# Patient Record
Sex: MALE | Race: WHITE | Employment: UNEMPLOYED
[De-identification: names, ages, dates, MRNs, and addresses within clinical notes are randomized per-mention and may not be internally consistent; named-entity substitution may affect disease eponyms.]

---

## 2017-01-09 ENCOUNTER — OFFICE VISIT (OUTPATIENT)
Dept: INTERNAL MEDICINE | Facility: CLINIC | Age: 52
End: 2017-01-09

## 2017-01-09 VITALS
BODY MASS INDEX: 26.99 KG/M2 | RESPIRATION RATE: 14 BRPM | SYSTOLIC BLOOD PRESSURE: 132 MMHG | DIASTOLIC BLOOD PRESSURE: 82 MMHG | HEIGHT: 65 IN | WEIGHT: 162 LBS

## 2017-01-09 DIAGNOSIS — I10 ESSENTIAL HYPERTENSION, BENIGN: Primary | ICD-10-CM

## 2017-01-09 DIAGNOSIS — G80.9 INFANTILE CEREBRAL PALSY (HCC): ICD-10-CM

## 2017-01-09 DIAGNOSIS — F20.0 PARANOID SCHIZOPHRENIA (HCC): ICD-10-CM

## 2017-01-09 DIAGNOSIS — E03.9 ACQUIRED HYPOTHYROIDISM: ICD-10-CM

## 2017-01-09 PROCEDURE — 99213 OFFICE O/P EST LOW 20 MIN: CPT | Performed by: INTERNAL MEDICINE

## 2017-01-09 RX ORDER — RISPERIDONE 1 MG/1
TABLET, FILM COATED ORAL
COMMUNITY
Start: 2017-01-08

## 2017-01-09 RX ORDER — TRAZODONE HYDROCHLORIDE 50 MG/1
TABLET ORAL
COMMUNITY
Start: 2016-10-20 | End: 2018-03-16 | Stop reason: SDUPTHER

## 2017-01-09 ASSESSMENT — ENCOUNTER SYMPTOMS
SORE THROAT: 0
SHORTNESS OF BREATH: 0
RHINORRHEA: 0
COUGH: 0

## 2017-02-11 RX ORDER — LOVASTATIN 20 MG/1
TABLET ORAL
Qty: 90 TABLET | Refills: 3 | Status: SHIPPED | OUTPATIENT
Start: 2017-02-11 | End: 2017-05-17 | Stop reason: CLARIF

## 2017-04-14 ENCOUNTER — HOSPITAL ENCOUNTER (OUTPATIENT)
Age: 52
Discharge: HOME | End: 2017-04-14
Payer: MEDICARE

## 2017-04-14 LAB
ALBUMIN SERPL-MCNC: 4.3 G/DL (ref 3.5–5.2)
ALBUMIN/GLOBULIN RATIO: ABNORMAL (ref 1–2.5)
ALP BLD-CCNC: 76 U/L (ref 40–129)
ALT SERPL-CCNC: 36 U/L (ref 5–41)
ANION GAP SERPL CALCULATED.3IONS-SCNC: 12 MMOL/L (ref 9–17)
AST SERPL-CCNC: 23 U/L
BILIRUB SERPL-MCNC: 0.25 MG/DL (ref 0.3–1.2)
BUN BLDV-MCNC: 23 MG/DL (ref 6–20)
BUN/CREAT BLD: ABNORMAL (ref 9–20)
CALCIUM SERPL-MCNC: 9.3 MG/DL (ref 8.6–10.4)
CHLORIDE BLD-SCNC: 106 MMOL/L (ref 98–107)
CHOLESTEROL/HDL RATIO: 6.8
CHOLESTEROL: 211 MG/DL
CO2: 25 MMOL/L (ref 20–31)
CREAT SERPL-MCNC: 0.86 MG/DL (ref 0.7–1.2)
ESTIMATED AVERAGE GLUCOSE: 117 MG/DL
GFR AFRICAN AMERICAN: >60 ML/MIN
GFR NON-AFRICAN AMERICAN: >60 ML/MIN
GFR SERPL CREATININE-BSD FRML MDRD: ABNORMAL ML/MIN/{1.73_M2}
GFR SERPL CREATININE-BSD FRML MDRD: ABNORMAL ML/MIN/{1.73_M2}
GLUCOSE BLD-MCNC: 113 MG/DL (ref 70–99)
HBA1C MFR BLD: 5.7 % (ref 4–6)
HDLC SERPL-MCNC: 31 MG/DL
LDL CHOLESTEROL DIRECT: 101 MG/DL
LDL CHOLESTEROL: ABNORMAL MG/DL (ref 0–130)
POTASSIUM SERPL-SCNC: 4.4 MMOL/L (ref 3.7–5.3)
SODIUM BLD-SCNC: 143 MMOL/L (ref 135–144)
TOTAL PROTEIN: 7.5 G/DL (ref 6.4–8.3)
TRIGL SERPL-MCNC: 556 MG/DL
VLDLC SERPL CALC-MCNC: ABNORMAL MG/DL (ref 1–30)

## 2017-04-14 PROCEDURE — 83721 ASSAY OF BLOOD LIPOPROTEIN: CPT

## 2017-04-14 PROCEDURE — 83036 HEMOGLOBIN GLYCOSYLATED A1C: CPT

## 2017-04-14 PROCEDURE — 36415 COLL VENOUS BLD VENIPUNCTURE: CPT

## 2017-04-14 PROCEDURE — 80053 COMPREHEN METABOLIC PANEL: CPT

## 2017-04-14 PROCEDURE — 80061 LIPID PANEL: CPT

## 2017-04-26 ENCOUNTER — TELEPHONE (OUTPATIENT)
Dept: INTERNAL MEDICINE CLINIC | Age: 52
End: 2017-04-26

## 2017-05-09 ENCOUNTER — TELEPHONE (OUTPATIENT)
Dept: INTERNAL MEDICINE CLINIC | Age: 52
End: 2017-05-09

## 2017-05-17 ENCOUNTER — OFFICE VISIT (OUTPATIENT)
Dept: INTERNAL MEDICINE CLINIC | Age: 52
End: 2017-05-17
Payer: MEDICARE

## 2017-05-17 VITALS
DIASTOLIC BLOOD PRESSURE: 80 MMHG | BODY MASS INDEX: 27.32 KG/M2 | WEIGHT: 164 LBS | SYSTOLIC BLOOD PRESSURE: 120 MMHG | RESPIRATION RATE: 14 BRPM | HEIGHT: 65 IN

## 2017-05-17 DIAGNOSIS — G80.9 INFANTILE CEREBRAL PALSY (HCC): Primary | ICD-10-CM

## 2017-05-17 DIAGNOSIS — B37.49 CANDIDIASIS OF PERINEUM: ICD-10-CM

## 2017-05-17 DIAGNOSIS — E03.8 OTHER SPECIFIED HYPOTHYROIDISM: ICD-10-CM

## 2017-05-17 PROCEDURE — 3017F COLORECTAL CA SCREEN DOC REV: CPT | Performed by: INTERNAL MEDICINE

## 2017-05-17 PROCEDURE — 1036F TOBACCO NON-USER: CPT | Performed by: INTERNAL MEDICINE

## 2017-05-17 PROCEDURE — G8427 DOCREV CUR MEDS BY ELIG CLIN: HCPCS | Performed by: INTERNAL MEDICINE

## 2017-05-17 PROCEDURE — 99214 OFFICE O/P EST MOD 30 MIN: CPT | Performed by: INTERNAL MEDICINE

## 2017-05-17 PROCEDURE — G8419 CALC BMI OUT NRM PARAM NOF/U: HCPCS | Performed by: INTERNAL MEDICINE

## 2017-05-17 RX ORDER — LEVOTHYROXINE SODIUM 0.05 MG/1
TABLET ORAL
Qty: 90 TABLET | Refills: 3 | Status: SHIPPED | OUTPATIENT
Start: 2017-05-17 | End: 2018-09-04 | Stop reason: SDUPTHER

## 2017-05-17 RX ORDER — FUROSEMIDE 20 MG/1
20 TABLET ORAL DAILY
Qty: 90 TABLET | Refills: 3 | Status: SHIPPED | OUTPATIENT
Start: 2017-05-17 | End: 2018-06-02 | Stop reason: SDUPTHER

## 2017-05-17 RX ORDER — LOVASTATIN 20 MG/1
20 TABLET ORAL NIGHTLY
Qty: 90 TABLET | Refills: 3 | Status: SHIPPED | OUTPATIENT
Start: 2017-05-17 | End: 2018-07-11 | Stop reason: SDUPTHER

## 2017-05-17 RX ORDER — RISPERIDONE 1 MG/1
1 TABLET, FILM COATED ORAL DAILY
Qty: 90 TABLET | Refills: 3 | Status: CANCELLED | OUTPATIENT
Start: 2017-05-17

## 2017-05-17 ASSESSMENT — ENCOUNTER SYMPTOMS
COUGH: 0
RHINORRHEA: 0
SHORTNESS OF BREATH: 0
SORE THROAT: 0

## 2017-05-17 ASSESSMENT — PATIENT HEALTH QUESTIONNAIRE - PHQ9
SUM OF ALL RESPONSES TO PHQ9 QUESTIONS 1 & 2: 0
2. FEELING DOWN, DEPRESSED OR HOPELESS: 0
SUM OF ALL RESPONSES TO PHQ QUESTIONS 1-9: 0
1. LITTLE INTEREST OR PLEASURE IN DOING THINGS: 0

## 2017-09-01 ENCOUNTER — OFFICE VISIT (OUTPATIENT)
Dept: INTERNAL MEDICINE CLINIC | Age: 52
End: 2017-09-01
Payer: MEDICARE

## 2017-09-01 VITALS
DIASTOLIC BLOOD PRESSURE: 80 MMHG | HEIGHT: 65 IN | WEIGHT: 157 LBS | BODY MASS INDEX: 26.16 KG/M2 | SYSTOLIC BLOOD PRESSURE: 128 MMHG

## 2017-09-01 DIAGNOSIS — F20.0 PARANOID SCHIZOPHRENIA (HCC): ICD-10-CM

## 2017-09-01 DIAGNOSIS — E03.9 ACQUIRED HYPOTHYROIDISM: ICD-10-CM

## 2017-09-01 DIAGNOSIS — J40 TRACHEOBRONCHITIS: ICD-10-CM

## 2017-09-01 DIAGNOSIS — E78.2 MIXED HYPERLIPIDEMIA: ICD-10-CM

## 2017-09-01 DIAGNOSIS — Z12.11 COLON CANCER SCREENING: ICD-10-CM

## 2017-09-01 DIAGNOSIS — I10 ESSENTIAL HYPERTENSION, BENIGN: Primary | ICD-10-CM

## 2017-09-01 PROCEDURE — G8427 DOCREV CUR MEDS BY ELIG CLIN: HCPCS | Performed by: INTERNAL MEDICINE

## 2017-09-01 PROCEDURE — 1036F TOBACCO NON-USER: CPT | Performed by: INTERNAL MEDICINE

## 2017-09-01 PROCEDURE — 99214 OFFICE O/P EST MOD 30 MIN: CPT | Performed by: INTERNAL MEDICINE

## 2017-09-01 PROCEDURE — G8419 CALC BMI OUT NRM PARAM NOF/U: HCPCS | Performed by: INTERNAL MEDICINE

## 2017-09-01 PROCEDURE — 3017F COLORECTAL CA SCREEN DOC REV: CPT | Performed by: INTERNAL MEDICINE

## 2017-09-01 RX ORDER — CEPHALEXIN 500 MG/1
500 CAPSULE ORAL 3 TIMES DAILY
Qty: 30 CAPSULE | Refills: 0 | Status: SHIPPED | OUTPATIENT
Start: 2017-09-01 | End: 2017-09-11

## 2017-09-01 ASSESSMENT — ENCOUNTER SYMPTOMS
COUGH: 1
EYE DISCHARGE: 0
TROUBLE SWALLOWING: 0
SHORTNESS OF BREATH: 0
EYE PAIN: 0
BLOOD IN STOOL: 0
ABDOMINAL DISTENTION: 0
DIARRHEA: 0
SORE THROAT: 1
COLOR CHANGE: 0
WHEEZING: 0

## 2017-10-26 ENCOUNTER — TELEPHONE (OUTPATIENT)
Dept: OTHER | Facility: CLINIC | Age: 52
End: 2017-10-26

## 2018-03-02 ENCOUNTER — TELEPHONE (OUTPATIENT)
Dept: INTERNAL MEDICINE CLINIC | Age: 53
End: 2018-03-02

## 2018-03-02 NOTE — LETTER
NEGRITO JOSE [facility]  [address] [place] [address]  Phone: [phone]  Fax: [phone]      March 2, 2018     [address] [place] [id number]      Dear Catalina Franco:    I am writing you because I have been informed of your missed appointment(s). We ask that you please call 24 hours in advance if you are unable to make your appointment so that we can give that time to another patient in need. We care about you and the management of your healthcare and want to make sure that you follow up as recommended. I have to also mention, that in an effort to provide quality care and timely appointments to all my patients, it is our policy that patients be discharged from the practice if they do not show for three scheduled appointments. You would be informed of this termination by mail, and would have 30 days from the date of discharge to locate another physician. We're sorry you were unable to keep your appointment and hope that you are doing well. We would like to continue treating your healthcare needs. Please call the office to let us know your plans for treatment and to reschedule your appointment.      Sincerely,        Vitor Bird MD

## 2018-03-05 NOTE — TELEPHONE ENCOUNTER
Spoke with female who states they didn't get message from his father about the appointment.   appt rescheduled, letter mailed

## 2018-03-06 RX ORDER — LEVOTHYROXINE SODIUM 0.05 MG/1
TABLET ORAL
Qty: 90 TABLET | Refills: 0 | Status: SHIPPED | OUTPATIENT
Start: 2018-03-06 | End: 2018-03-16 | Stop reason: SDUPTHER

## 2018-03-16 ENCOUNTER — OFFICE VISIT (OUTPATIENT)
Dept: INTERNAL MEDICINE CLINIC | Age: 53
End: 2018-03-16
Payer: MEDICARE

## 2018-03-16 VITALS
BODY MASS INDEX: 26.66 KG/M2 | DIASTOLIC BLOOD PRESSURE: 60 MMHG | HEART RATE: 82 BPM | HEIGHT: 65 IN | WEIGHT: 160 LBS | SYSTOLIC BLOOD PRESSURE: 108 MMHG | OXYGEN SATURATION: 95 %

## 2018-03-16 DIAGNOSIS — G83.89 CEREBRAL PARESIS WITH HOMOLATERAL ATAXIA (HCC): ICD-10-CM

## 2018-03-16 DIAGNOSIS — F20.0 PARANOID SCHIZOPHRENIA (HCC): ICD-10-CM

## 2018-03-16 DIAGNOSIS — Z12.11 SCREENING FOR COLON CANCER: ICD-10-CM

## 2018-03-16 DIAGNOSIS — G80.9 INFANTILE CEREBRAL PALSY (HCC): Primary | ICD-10-CM

## 2018-03-16 DIAGNOSIS — E03.9 ACQUIRED HYPOTHYROIDISM: ICD-10-CM

## 2018-03-16 DIAGNOSIS — F20.0 PARANOID SCHIZOPHRENIA, SUBCHRONIC CONDITION (HCC): ICD-10-CM

## 2018-03-16 PROCEDURE — G8419 CALC BMI OUT NRM PARAM NOF/U: HCPCS | Performed by: INTERNAL MEDICINE

## 2018-03-16 PROCEDURE — G8484 FLU IMMUNIZE NO ADMIN: HCPCS | Performed by: INTERNAL MEDICINE

## 2018-03-16 PROCEDURE — G8427 DOCREV CUR MEDS BY ELIG CLIN: HCPCS | Performed by: INTERNAL MEDICINE

## 2018-03-16 PROCEDURE — 1036F TOBACCO NON-USER: CPT | Performed by: INTERNAL MEDICINE

## 2018-03-16 PROCEDURE — 3017F COLORECTAL CA SCREEN DOC REV: CPT | Performed by: INTERNAL MEDICINE

## 2018-03-16 PROCEDURE — 99213 OFFICE O/P EST LOW 20 MIN: CPT | Performed by: INTERNAL MEDICINE

## 2018-03-16 RX ORDER — TRAZODONE HYDROCHLORIDE 100 MG/1
TABLET ORAL
Refills: 2 | COMMUNITY
Start: 2018-03-06

## 2018-03-16 ASSESSMENT — ENCOUNTER SYMPTOMS
ABDOMINAL DISTENTION: 0
COUGH: 0
SHORTNESS OF BREATH: 0
COLOR CHANGE: 0
WHEEZING: 0
EYE PAIN: 0
EYE DISCHARGE: 0
TROUBLE SWALLOWING: 0
DIARRHEA: 0
BLOOD IN STOOL: 0

## 2018-03-16 NOTE — PROGRESS NOTES
Subjective:      Patient ID: Vanessa Worley is a 45 y.o. male. Chronic Disease Visit Information    BP Readings from Last 3 Encounters:   03/16/18 108/60   09/01/17 128/80   05/17/17 120/80          Hemoglobin A1C (%)   Date Value   04/14/2017 5.7   05/20/2016 5.4   03/01/2016 5.4     LDL Cholesterol (mg/dL)   Date Value   04/14/2017          HDL (mg/dL)   Date Value   04/14/2017 31 (L)     BUN (mg/dL)   Date Value   04/14/2017 23 (H)     CREATININE (mg/dL)   Date Value   04/14/2017 0.86     Glucose (mg/dL)   Date Value   04/14/2017 113 (H)   04/18/2012 99            Have you changed or started any medications since your last visit including any over-the-counter medicines, vitamins, or herbal medicines? no   Are you having any side effects from any of your medications? -  no  Have you stopped taking any of your medications? Is so, why? -  no    Have you seen any other physician or provider since your last visit? No  Have you had any other diagnostic tests since your last visit? No  Have you been seen in the emergency room and/or had an admission to a hospital since we last saw you? No  Have you had your annual diabetic retinal (eye) exam? No  Have you had your routine dental cleaning in the past 6 months? no    Have you activated your [employer] account? If not, what are your barriers?  Yes     Patient Care Team:  Vega Boykin MD as PCP - Soledad Sharpe MD as PCP - S Attributed Provider         Medical History Review  Past Medical, Family, and Social History reviewed and does not contribute to the patient presenting condition    Health Maintenance   Topic Date Due    Hepatitis C screen  1965    HIV screen  12/29/1978    DTaP/Tdap/Td vaccine (1 - Tdap) 12/29/1982    Shingles Vaccine (1 of 2 - 2 Dose Series) 12/29/2013    Colon Cancer Screen FIT/FOBT  09/25/2016    TSH testing  03/01/2017    Flu vaccine (1) 03/28/2019 (Originally 9/1/2017)    A1C test (Diabetic or Prediabetic)  04/14/2018 breath sounds normal. No accessory muscle usage. No tachypnea and no bradypnea. No respiratory distress. He has no wheezes. He has no rales. Abdominal: Soft. Bowel sounds are normal. He exhibits no distension. There is no tenderness. There is no rebound. Musculoskeletal: He exhibits edema and deformity. He exhibits no tenderness. vonda lower ext defomity  Walks with crutches     Lymphadenopathy:        Head (right side): No submental and no submandibular adenopathy present. Head (left side): No submental and no submandibular adenopathy present. He has no cervical adenopathy. Neurological: He is alert and oriented to person, place, and time. He displays no atrophy. No cranial nerve deficit or sensory deficit. He exhibits normal muscle tone. Coordination normal.   Skin: Skin is warm. No bruising, no ecchymosis and no rash noted. He is not diaphoretic. No pallor. Psychiatric: He has a normal mood and affect. His behavior is normal. His mood appears not anxious. His affect is not angry. His speech is not slurred. He is not aggressive. Cognition and memory are not impaired. He expresses no homicidal ideation. I have personally reviewed and agree with the patient BHAVIK LUCAS and Cameron Christina 20 is a 45 y.o. male who presents today for follow up on his chronic medical conditions as noted below.   Sheila Reach is c/o of   Chief Complaint   Patient presents with    Hypertension     managment    Other     hcc gap score       Patient Active Problem List:     Essential hypertension, benign     Infantile cerebral palsy ([address]     Hypothyroidism     Paranoid schizophrenia ([address]     Candidiasis of perineum     Mixed hyperlipidemia     Past Medical History:   Diagnosis Date    Bipolar disorder, unspecified     Depression     Edema     Essential hypertension, benign     Hypothyroid     Impetigo     Infantile cerebral palsy, unspecified     Other and unspecified hyperlipidemia     Schizophrenia, MCHC 34.2 08/31/2014    RDW 13.1 08/31/2014     08/31/2014     04/18/2012    MPV 7.1 08/31/2014       Lab Results   Component Value Date    LABA1C 5.7 04/14/2017       Lab Results   Component Value Date    HDL 31 04/14/2017    LDLCHOLESTEROL      04/14/2017       Assessment:      1. Infantile cerebral palsy ([address]     2. Paranoid schizophrenia ([address]     3. Screening for colon cancer  POCT Fecal Immunochemical Test (FIT)   4. Acquired hypothyroidism             Plan:       No Follow-up on file. Orders Placed This Encounter   Procedures    POCT Fecal Immunochemical Test (FIT)     Standing Status:   Future     Standing Expiration Date:   3/16/2019     No orders of the defined types were placed in this encounter. Quality & Risk Score Accuracy - MEDICARE ADVANTAGE    Visit Dx:  Paranoid schizophrenia, subchronic condition ([facility]  Stable based upon review of recent symptoms. Continue current treatment plan and follow up at least yearly. Visit Dx:  Cerebral paresis with homolateral ataxia ([facility]  Stable based upon symptoms and exam. Continue current treatment plan and follow up at least yearly.   Last edited 03/16/18 13:33 EDT by Paris Isidro MD

## 2018-05-09 ENCOUNTER — HOSPITAL ENCOUNTER (OUTPATIENT)
Age: 53
Discharge: HOME | End: 2018-05-09
Payer: MEDICARE

## 2018-05-09 LAB
ALBUMIN SERPL-MCNC: 4.2 G/DL (ref 3.5–5.2)
ALBUMIN/GLOBULIN RATIO: ABNORMAL (ref 1–2.5)
ALP BLD-CCNC: 77 U/L (ref 40–129)
ALT SERPL-CCNC: 34 U/L (ref 5–41)
ANION GAP SERPL CALCULATED.3IONS-SCNC: 12 MMOL/L (ref 9–17)
AST SERPL-CCNC: 22 U/L
BILIRUB SERPL-MCNC: 0.45 MG/DL (ref 0.3–1.2)
BUN BLDV-MCNC: 12 MG/DL (ref 6–20)
BUN/CREAT BLD: ABNORMAL (ref 9–20)
CALCIUM SERPL-MCNC: 8.6 MG/DL (ref 8.6–10.4)
CHLORIDE BLD-SCNC: 104 MMOL/L (ref 98–107)
CHOLESTEROL/HDL RATIO: 4.7
CHOLESTEROL: 180 MG/DL
CO2: 25 MMOL/L (ref 20–31)
CREAT SERPL-MCNC: 0.83 MG/DL (ref 0.7–1.2)
ESTIMATED AVERAGE GLUCOSE: 117 MG/DL
GFR AFRICAN AMERICAN: >60 ML/MIN
GFR NON-AFRICAN AMERICAN: >60 ML/MIN
GFR SERPL CREATININE-BSD FRML MDRD: ABNORMAL ML/MIN/{1.73_M2}
GFR SERPL CREATININE-BSD FRML MDRD: ABNORMAL ML/MIN/{1.73_M2}
GLUCOSE BLD-MCNC: 106 MG/DL (ref 70–99)
HBA1C MFR BLD: 5.7 % (ref 4–6)
HDLC SERPL-MCNC: 38 MG/DL
LDL CHOLESTEROL: 83 MG/DL (ref 0–130)
POTASSIUM SERPL-SCNC: 4 MMOL/L (ref 3.7–5.3)
SODIUM BLD-SCNC: 141 MMOL/L (ref 135–144)
TOTAL PROTEIN: 7.2 G/DL (ref 6.4–8.3)
TRIGL SERPL-MCNC: 294 MG/DL
VLDLC SERPL CALC-MCNC: ABNORMAL MG/DL (ref 1–30)

## 2018-05-09 PROCEDURE — 36415 COLL VENOUS BLD VENIPUNCTURE: CPT

## 2018-05-09 PROCEDURE — 80061 LIPID PANEL: CPT

## 2018-05-09 PROCEDURE — 80053 COMPREHEN METABOLIC PANEL: CPT

## 2018-05-09 PROCEDURE — 83036 HEMOGLOBIN GLYCOSYLATED A1C: CPT

## 2018-06-04 RX ORDER — FUROSEMIDE 20 MG/1
20 TABLET ORAL DAILY
Qty: 90 TABLET | Refills: 2 | Status: SHIPPED | OUTPATIENT
Start: 2018-06-04 | End: 2019-04-17 | Stop reason: SDUPTHER

## 2018-07-11 RX ORDER — LEVOTHYROXINE SODIUM 0.05 MG/1
TABLET ORAL
Qty: 90 TABLET | Refills: 2 | Status: SHIPPED | OUTPATIENT
Start: 2018-07-11 | End: 2018-09-04 | Stop reason: SDUPTHER

## 2018-07-11 RX ORDER — LEVOTHYROXINE SODIUM 0.05 MG/1
TABLET ORAL
Qty: 90 TABLET | Refills: 2 | Status: SHIPPED | OUTPATIENT
Start: 2018-07-11 | End: 2019-04-08 | Stop reason: SDUPTHER

## 2018-07-11 RX ORDER — LOVASTATIN 20 MG/1
TABLET ORAL
Qty: 90 TABLET | Refills: 2 | Status: SHIPPED | OUTPATIENT
Start: 2018-07-11 | End: 2019-04-07 | Stop reason: SDUPTHER

## 2018-09-04 ENCOUNTER — HOSPITAL ENCOUNTER (OUTPATIENT)
Age: 53
Setting detail: SPECIMEN
Discharge: HOME | End: 2018-09-04
Payer: MEDICARE

## 2018-09-04 ENCOUNTER — OFFICE VISIT (OUTPATIENT)
Dept: INTERNAL MEDICINE CLINIC | Age: 53
End: 2018-09-04
Payer: MEDICARE

## 2018-09-04 VITALS
HEIGHT: 61 IN | WEIGHT: 161 LBS | DIASTOLIC BLOOD PRESSURE: 62 MMHG | SYSTOLIC BLOOD PRESSURE: 108 MMHG | BODY MASS INDEX: 30.4 KG/M2

## 2018-09-04 DIAGNOSIS — I10 ESSENTIAL HYPERTENSION, BENIGN: ICD-10-CM

## 2018-09-04 DIAGNOSIS — K59.1 FUNCTIONAL DIARRHEA: Primary | ICD-10-CM

## 2018-09-04 DIAGNOSIS — E03.9 ACQUIRED HYPOTHYROIDISM: ICD-10-CM

## 2018-09-04 DIAGNOSIS — Z12.11 SCREENING FOR COLON CANCER: ICD-10-CM

## 2018-09-04 LAB — TSH SERPL DL<=0.05 MIU/L-ACNC: 4.22 MIU/L (ref 0.3–5)

## 2018-09-04 PROCEDURE — G8427 DOCREV CUR MEDS BY ELIG CLIN: HCPCS | Performed by: INTERNAL MEDICINE

## 2018-09-04 PROCEDURE — 99214 OFFICE O/P EST MOD 30 MIN: CPT | Performed by: INTERNAL MEDICINE

## 2018-09-04 PROCEDURE — 3017F COLORECTAL CA SCREEN DOC REV: CPT | Performed by: INTERNAL MEDICINE

## 2018-09-04 PROCEDURE — 1036F TOBACCO NON-USER: CPT | Performed by: INTERNAL MEDICINE

## 2018-09-04 PROCEDURE — G8419 CALC BMI OUT NRM PARAM NOF/U: HCPCS | Performed by: INTERNAL MEDICINE

## 2018-09-04 ASSESSMENT — PATIENT HEALTH QUESTIONNAIRE - PHQ9
2. FEELING DOWN, DEPRESSED OR HOPELESS: 0
SUM OF ALL RESPONSES TO PHQ QUESTIONS 1-9: 0
SUM OF ALL RESPONSES TO PHQ QUESTIONS 1-9: 0
SUM OF ALL RESPONSES TO PHQ9 QUESTIONS 1 & 2: 0
1. LITTLE INTEREST OR PLEASURE IN DOING THINGS: 0

## 2018-09-04 ASSESSMENT — ENCOUNTER SYMPTOMS
DIARRHEA: 1
SHORTNESS OF BREATH: 0

## 2018-09-04 NOTE — PROGRESS NOTES
Subjective:      Patient ID: Yossi Stevenson is a 45 y.o. male. Chronic Disease Visit Information    BP Readings from Last 3 Encounters:   03/16/18 108/60   09/01/17 128/80   05/17/17 120/80          Hemoglobin A1C (%)   Date Value   05/09/2018 5.7   04/14/2017 5.7   05/20/2016 5.4     LDL Cholesterol (mg/dL)   Date Value   05/09/2018 83     HDL (mg/dL)   Date Value   05/09/2018 38 (L)     BUN (mg/dL)   Date Value   05/09/2018 12     CREATININE (mg/dL)   Date Value   05/09/2018 0.83     Glucose (mg/dL)   Date Value   05/09/2018 106 (H)   04/18/2012 99            Have you changed or started any medications since your last visit including any over-the-counter medicines, vitamins, or herbal medicines? no   Are you having any side effects from any of your medications? -  no  Have you stopped taking any of your medications? Is so, why? -  no    Have you seen any other physician or provider since your last visit? No  Have you had any other diagnostic tests since your last visit? No  Have you been seen in the emergency room and/or had an admission to a hospital since we last saw you? No  Have you had your annual diabetic retinal (eye) exam? No  Have you had your routine dental cleaning in the past 6 months? no    Have you activated your [employer] account? If not, what are your barriers?  Yes     Patient Care Team:  Maria Mcclure MD as PCP - General (Internal Medicine)  Maria Mcclure MD as PCP - S Attributed Provider     Chief Complaint   Patient presents with    Hypertension    Hyperlipidemia    Hypothyroidism              Medical History Review  Past Medical, Family, and Social History reviewed and does contribute to the patient presenting condition    Health Maintenance   Topic Date Due    Hepatitis C screen  1965    HIV screen  12/29/1978    Colon Cancer Screen FIT/FOBT  09/25/2016    TSH testing  03/01/2017    Flu vaccine (1) 03/28/2019 (Originally 9/1/2018)    DTaP/Tdap/Td vaccine (1 - Tdap) 09/05/2019 (Originally 12/29/1982)    Shingles Vaccine (1 of 2 - 2 Dose Series) 09/13/2019 (Originally 12/29/2013)    A1C test (Diabetic or Prediabetic)  05/09/2019    Potassium monitoring  05/09/2019    Creatinine monitoring  05/09/2019    Lipid screen  05/09/2023       Neurologic Problem   The patient's primary symptoms include a loss of balance and weakness. Primary symptoms comment: has cerebral palsy. This is a chronic problem. The current episode started more than 1 year ago. Pertinent negatives include no fatigue, fever or shortness of breath. Review of Systems   Constitutional: Negative for fatigue and fever. Respiratory: Negative for shortness of breath. Gastrointestinal: Positive for diarrhea. He has soft and more frequent stools but he has no mucus or blood   Neurological: Positive for weakness and loss of balance. Objective:   Physical Exam   Constitutional: He is oriented to person, place, and time. He appears well-developed and well-nourished. He is active and cooperative. He does not have a sickly appearance. No distress. HENT:   Head: Normocephalic and atraumatic. Head is without abrasion, without contusion and without laceration. Right Ear: External ear normal.   Left Ear: External ear normal.   Nose: No mucosal edema, nose lacerations or septal deviation. Mouth/Throat: Oropharynx is clear and moist. Mucous membranes are not pale. No oropharyngeal exudate or posterior oropharyngeal edema. Eyes: Pupils are equal, round, and reactive to light. EOM are normal. Right eye exhibits no discharge. Left eye exhibits no discharge. No scleral icterus. Neck: Neck supple. No hepatojugular reflux present. Carotid bruit is not present. No thyroid mass and no thyromegaly present. Cardiovascular: Normal rate, regular rhythm and normal heart sounds. No murmur heard. Pulmonary/Chest: Effort normal and breath sounds normal. He has no wheezes. He has no rales.    Abdominal: Soft. He exhibits no distension, no ascites and no mass. There is no hepatosplenomegaly. There is no tenderness. There is no rigidity, no rebound and no CVA tenderness. No hernia. Hernia confirmed negative in the ventral area. Genitourinary:         Musculoskeletal: Normal range of motion. He exhibits no tenderness. Right lower leg: He exhibits edema. Left lower leg: He exhibits edema. Lymphadenopathy:     He has no cervical adenopathy. He has no axillary adenopathy. Right: No supraclavicular and no epitrochlear adenopathy present. Left: No supraclavicular and no epitrochlear adenopathy present. Neurological: He is alert and oriented to person, place, and time. He displays atrophy and tremor. No cranial nerve deficit. He exhibits abnormal muscle tone (cerebral palsy). He displays no seizure activity. Coordination and gait abnormal.   Has cerebral palsy     Skin: Skin is warm and dry. No bruising, no laceration, no petechiae and no rash noted. He is not diaphoretic. No cyanosis or erythema. No pallor. Nails show no clubbing. Psychiatric: He has a normal mood and affect. His speech is normal and behavior is normal. Judgment and thought content normal. Cognition and memory are normal.   Vitals reviewed. Assessment / Plan:       Diagnosis Orders   1. Functional diarrhea  It could be due to Zoloft,he was advised to cut down Zoloft to 25 mg daily   2. Screening for colon cancer  POCT FECAL IMMUNOCHEMICAL TEST (FIT)   3. Acquired hypothyroidism  TSH without Reflex   4. Essential hypertension, benign       Return in about 12 weeks (around 11/27/2018) for Follow Up, hypertension.   Orders Placed This Encounter   Procedures    TSH without Reflex     Standing Status:   Future     Standing Expiration Date:   9/4/2019    POCT FECAL IMMUNOCHEMICAL TEST (FIT)     Standing Status:   Future     Standing Expiration Date:   9/4/2019     No orders of the defined types were placed in this encounter.

## 2019-04-08 RX ORDER — LOVASTATIN 20 MG/1
TABLET ORAL
Qty: 90 TABLET | Refills: 3 | Status: SHIPPED | OUTPATIENT
Start: 2019-04-08 | End: 2020-01-20 | Stop reason: SDUPTHER

## 2019-04-08 RX ORDER — LEVOTHYROXINE SODIUM 0.05 MG/1
TABLET ORAL
Qty: 90 TABLET | Refills: 3 | Status: SHIPPED | OUTPATIENT
Start: 2019-04-08 | End: 2020-01-20 | Stop reason: SDUPTHER

## 2019-04-17 RX ORDER — FUROSEMIDE 20 MG/1
20 TABLET ORAL DAILY
Qty: 90 TABLET | Refills: 0 | Status: SHIPPED | OUTPATIENT
Start: 2019-04-17 | End: 2019-05-01 | Stop reason: SDUPTHER

## 2019-05-01 ENCOUNTER — OFFICE VISIT (OUTPATIENT)
Dept: INTERNAL MEDICINE CLINIC | Age: 54
End: 2019-05-01
Payer: MEDICARE

## 2019-05-01 VITALS
HEART RATE: 78 BPM | HEIGHT: 61 IN | DIASTOLIC BLOOD PRESSURE: 84 MMHG | SYSTOLIC BLOOD PRESSURE: 124 MMHG | BODY MASS INDEX: 24.35 KG/M2 | OXYGEN SATURATION: 98 % | TEMPERATURE: 98.2 F | WEIGHT: 129 LBS

## 2019-05-01 DIAGNOSIS — J06.9 UPPER RESPIRATORY TRACT INFECTION, UNSPECIFIED TYPE: Primary | ICD-10-CM

## 2019-05-01 PROCEDURE — G8420 CALC BMI NORM PARAMETERS: HCPCS | Performed by: PHYSICIAN ASSISTANT

## 2019-05-01 PROCEDURE — G8427 DOCREV CUR MEDS BY ELIG CLIN: HCPCS | Performed by: PHYSICIAN ASSISTANT

## 2019-05-01 PROCEDURE — 1036F TOBACCO NON-USER: CPT | Performed by: PHYSICIAN ASSISTANT

## 2019-05-01 PROCEDURE — G8510 SCR DEP NEG, NO PLAN REQD: HCPCS | Performed by: PHYSICIAN ASSISTANT

## 2019-05-01 PROCEDURE — 99213 OFFICE O/P EST LOW 20 MIN: CPT | Performed by: PHYSICIAN ASSISTANT

## 2019-05-01 PROCEDURE — 3017F COLORECTAL CA SCREEN DOC REV: CPT | Performed by: PHYSICIAN ASSISTANT

## 2019-05-01 RX ORDER — AZITHROMYCIN 250 MG/1
250 TABLET, FILM COATED ORAL SEE ADMIN INSTRUCTIONS
Qty: 6 TABLET | Refills: 0 | Status: SHIPPED | OUTPATIENT
Start: 2019-05-01 | End: 2019-05-06

## 2019-05-01 RX ORDER — BENZONATATE 100 MG/1
100-200 CAPSULE ORAL 3 TIMES DAILY PRN
Qty: 60 CAPSULE | Refills: 0 | Status: SHIPPED | OUTPATIENT
Start: 2019-05-01 | End: 2019-08-18 | Stop reason: SDUPTHER

## 2019-05-01 RX ORDER — FUROSEMIDE 20 MG/1
20 TABLET ORAL DAILY
Qty: 90 TABLET | Refills: 0 | Status: SHIPPED | OUTPATIENT
Start: 2019-05-01 | End: 2019-07-27 | Stop reason: SDUPTHER

## 2019-05-01 ASSESSMENT — ENCOUNTER SYMPTOMS
COUGH: 1
NAUSEA: 0
EYE REDNESS: 0
SHORTNESS OF BREATH: 0
SINUS PRESSURE: 0
VOMITING: 0
SORE THROAT: 1
CHEST TIGHTNESS: 0
WHEEZING: 0
COLOR CHANGE: 0
EYE PAIN: 0
RHINORRHEA: 0
ABDOMINAL PAIN: 0

## 2019-05-01 ASSESSMENT — PATIENT HEALTH QUESTIONNAIRE - PHQ9
SUM OF ALL RESPONSES TO PHQ QUESTIONS 1-9: 0
1. LITTLE INTEREST OR PLEASURE IN DOING THINGS: 0
2. FEELING DOWN, DEPRESSED OR HOPELESS: 0
SUM OF ALL RESPONSES TO PHQ9 QUESTIONS 1 & 2: 0
SUM OF ALL RESPONSES TO PHQ QUESTIONS 1-9: 0

## 2019-05-01 NOTE — PROGRESS NOTES
Visit Information    Have you changed or started any medications since your last visit including any over-the-counter medicines, vitamins, or herbal medicines? no   Are you having any side effects from any of your medications? -  no  Have you stopped taking any of your medications? Is so, why? -  no    Have you seen any other physician or provider since your last visit? No  Have you had any other diagnostic tests since your last visit? Yes  Have you been seen in the emergency room and/or had an admission to a hospital since we last saw you? No  Have you had your routine dental cleaning in the past 6 months? no    Have you activated your [employer] account? If not, what are your barriers?  Yes     Patient Care Team:  Rosa Maria Nava MD as PCP - General (Internal Medicine)  Chelsey Bucio MD as PCP - [facility] Attributed Provider    Medical History Review  Past Medical, Family, and Social History reviewed and does contribute to the patient presenting condition    Health Maintenance   Topic Date Due    Hepatitis C screen  1965    HIV screen  12/29/1978    Colon Cancer Screen FIT/FOBT  09/25/2016    A1C test (Diabetic or Prediabetic)  05/09/2019    Potassium monitoring  05/09/2019    Creatinine monitoring  05/09/2019    DTaP/Tdap/Td vaccine (1 - Tdap) 09/05/2019 (Originally 12/29/1982)    Shingles Vaccine (1 of 2) 09/13/2019 (Originally 12/29/2013)    Flu vaccine (Season Ended) 09/01/2019    TSH testing  09/04/2019    Lipid screen  05/09/2023    Pneumococcal 0-64 years Vaccine  Aged Out
no change from previous record. Karly Coleman  reports that he has never smoked. He has never used smokeless tobacco.    FAMILY HISTORY:    Reviewed and no change from previous visit    REVIEW OF SYSTEMS:    Review of Systems   Constitutional: Negative for chills, fatigue and fever. HENT: Positive for congestion and sore throat. Negative for drooling, ear discharge, ear pain, postnasal drip, rhinorrhea and sinus pressure. Eyes: Negative for pain and redness. Respiratory: Positive for cough. Negative for chest tightness, shortness of breath and wheezing. Cardiovascular: Negative for chest pain and leg swelling. Gastrointestinal: Negative for abdominal pain, nausea and vomiting. Musculoskeletal: Negative for neck pain and neck stiffness. Skin: Negative for color change and rash. Neurological: Negative for dizziness, light-headedness and headaches.       PHYSICAL EXAM:      Vitals:    05/01/19 1137   BP: 124/84   Site: Left Upper Arm   Position: Sitting   Cuff Size: Medium Adult   Pulse: 78   Temp: 98.2 °F (36.8 °C)   TempSrc: Oral   SpO2: 98%   Weight: 129 lb (58.5 kg)   Height: 5' 0.98\" (1.549 m)     General - alert, well appearing, in no acute distress  Skin - normal coloration and turgor, no rashes  Eyes - pupils equal and reactive, extraocular eye movements intact  Ears - bilateral TM's and external ear canals normal  Nose - normal and patent, no erythema, discharge or polyps  Mouth - mucous membranes are moist, pharynx without erythema, no soft tissue edema, uvula is midline  Neck - supple, no significant adenopathy  Lymphatics - no palpable lymphadenopathy, no hepatosplenomegaly  Chest - clear to ausculation bilaterally, no wheezes, crackles, rales or rhonchi, symmetric air entry  Heart - normal rate, regular rhythm, no murmur  Abdomen - soft, nontender, nondistended   Neurological - alert, oriented, normal speech, no focal sensory or motor deficit  Extremities - no pedal edema    ASSESSMENT AND

## 2019-06-12 ENCOUNTER — HOSPITAL ENCOUNTER (OUTPATIENT)
Age: 54
Setting detail: SPECIMEN
Discharge: HOME | End: 2019-06-12
Payer: MEDICARE

## 2019-06-12 ENCOUNTER — OFFICE VISIT (OUTPATIENT)
Dept: INTERNAL MEDICINE CLINIC | Age: 54
End: 2019-06-12
Payer: MEDICARE

## 2019-06-12 VITALS
HEART RATE: 66 BPM | SYSTOLIC BLOOD PRESSURE: 136 MMHG | OXYGEN SATURATION: 97 % | HEIGHT: 61 IN | RESPIRATION RATE: 15 BRPM | BODY MASS INDEX: 31.34 KG/M2 | WEIGHT: 166 LBS | DIASTOLIC BLOOD PRESSURE: 83 MMHG

## 2019-06-12 DIAGNOSIS — R79.9 ABNORMAL FINDING OF BLOOD CHEMISTRY: ICD-10-CM

## 2019-06-12 DIAGNOSIS — G80.9 INFANTILE CEREBRAL PALSY (HCC): ICD-10-CM

## 2019-06-12 DIAGNOSIS — F20.0 PARANOID SCHIZOPHRENIA (HCC): ICD-10-CM

## 2019-06-12 DIAGNOSIS — I10 ESSENTIAL HYPERTENSION, BENIGN: ICD-10-CM

## 2019-06-12 DIAGNOSIS — E78.2 MIXED HYPERLIPIDEMIA: ICD-10-CM

## 2019-06-12 DIAGNOSIS — E03.9 ACQUIRED HYPOTHYROIDISM: ICD-10-CM

## 2019-06-12 DIAGNOSIS — I10 ESSENTIAL HYPERTENSION, BENIGN: Primary | ICD-10-CM

## 2019-06-12 LAB
-: NORMAL
ABSOLUTE EOS #: 0.06 K/UL (ref 0–0.44)
ABSOLUTE IMMATURE GRANULOCYTE: <0.03 K/UL (ref 0–0.3)
ABSOLUTE LYMPH #: 0.92 K/UL (ref 1.1–3.7)
ABSOLUTE MONO #: 0.5 K/UL (ref 0.1–1.2)
ALBUMIN SERPL-MCNC: 4.5 G/DL (ref 3.5–5.2)
ALBUMIN/GLOBULIN RATIO: 1.2 (ref 1–2.5)
ALP BLD-CCNC: 79 U/L (ref 40–129)
ALT SERPL-CCNC: 33 U/L (ref 5–41)
AMORPHOUS: NORMAL
ANION GAP SERPL CALCULATED.3IONS-SCNC: 17 MMOL/L (ref 9–17)
AST SERPL-CCNC: 21 U/L
BACTERIA: NORMAL
BASOPHILS # BLD: 0 % (ref 0–2)
BASOPHILS ABSOLUTE: <0.03 K/UL (ref 0–0.2)
BILIRUB SERPL-MCNC: 0.44 MG/DL (ref 0.3–1.2)
BILIRUBIN URINE: NEGATIVE
BUN BLDV-MCNC: 13 MG/DL (ref 6–20)
BUN/CREAT BLD: NORMAL (ref 9–20)
CALCIUM SERPL-MCNC: 9.4 MG/DL (ref 8.6–10.4)
CASTS UA: NORMAL /LPF (ref 0–8)
CHLORIDE BLD-SCNC: 101 MMOL/L (ref 98–107)
CO2: 22 MMOL/L (ref 20–31)
COLOR: YELLOW
CREAT SERPL-MCNC: 0.97 MG/DL (ref 0.7–1.2)
CRYSTALS, UA: NORMAL /HPF
DIFFERENTIAL TYPE: ABNORMAL
EOSINOPHILS RELATIVE PERCENT: 1 % (ref 1–4)
EPITHELIAL CELLS UA: NORMAL /HPF (ref 0–5)
GFR AFRICAN AMERICAN: >60 ML/MIN
GFR NON-AFRICAN AMERICAN: >60 ML/MIN
GFR SERPL CREATININE-BSD FRML MDRD: NORMAL ML/MIN/{1.73_M2}
GFR SERPL CREATININE-BSD FRML MDRD: NORMAL ML/MIN/{1.73_M2}
GLUCOSE BLD-MCNC: 93 MG/DL (ref 70–99)
GLUCOSE URINE: NEGATIVE
HCT VFR BLD CALC: 46.3 % (ref 40.7–50.3)
HEMOGLOBIN: 14.8 G/DL (ref 13–17)
IMMATURE GRANULOCYTES: 0 %
KETONES, URINE: NEGATIVE
LEUKOCYTE ESTERASE, URINE: NEGATIVE
LYMPHOCYTES # BLD: 17 % (ref 24–43)
MCH RBC QN AUTO: 29.1 PG (ref 25.2–33.5)
MCHC RBC AUTO-ENTMCNC: 32 G/DL (ref 28.4–34.8)
MCV RBC AUTO: 91 FL (ref 82.6–102.9)
MONOCYTES # BLD: 9 % (ref 3–12)
MUCUS: NORMAL
NITRITE, URINE: NEGATIVE
NRBC AUTOMATED: 0 PER 100 WBC
OTHER OBSERVATIONS UA: NORMAL
PDW BLD-RTO: 13.2 % (ref 11.8–14.4)
PH UA: 5 (ref 5–8)
PLATELET # BLD: 217 K/UL (ref 138–453)
PLATELET ESTIMATE: ABNORMAL
PMV BLD AUTO: 9.7 FL (ref 8.1–13.5)
POTASSIUM SERPL-SCNC: 4.2 MMOL/L (ref 3.7–5.3)
PROTEIN UA: NEGATIVE
RBC # BLD: 5.09 M/UL (ref 4.21–5.77)
RBC # BLD: ABNORMAL 10*6/UL
RBC UA: NORMAL /HPF (ref 0–4)
RENAL EPITHELIAL, UA: NORMAL /HPF
SEDIMENTATION RATE, ERYTHROCYTE: 15 MM (ref 0–10)
SEG NEUTROPHILS: 73 % (ref 36–65)
SEGMENTED NEUTROPHILS ABSOLUTE COUNT: 3.94 K/UL (ref 1.5–8.1)
SODIUM BLD-SCNC: 140 MMOL/L (ref 135–144)
SPECIFIC GRAVITY UA: 1.02 (ref 1–1.03)
TOTAL PROTEIN: 8.2 G/DL (ref 6.4–8.3)
TRICHOMONAS: NORMAL
TURBIDITY: CLEAR
URINE HGB: NEGATIVE
UROBILINOGEN, URINE: NORMAL
WBC # BLD: 5.5 K/UL (ref 3.5–11.3)
WBC # BLD: ABNORMAL 10*3/UL
WBC UA: NORMAL /HPF (ref 0–5)
YEAST: NORMAL

## 2019-06-12 PROCEDURE — G8417 CALC BMI ABV UP PARAM F/U: HCPCS | Performed by: INTERNAL MEDICINE

## 2019-06-12 PROCEDURE — 99214 OFFICE O/P EST MOD 30 MIN: CPT | Performed by: INTERNAL MEDICINE

## 2019-06-12 PROCEDURE — 3017F COLORECTAL CA SCREEN DOC REV: CPT | Performed by: INTERNAL MEDICINE

## 2019-06-12 PROCEDURE — G8427 DOCREV CUR MEDS BY ELIG CLIN: HCPCS | Performed by: INTERNAL MEDICINE

## 2019-06-12 PROCEDURE — 1036F TOBACCO NON-USER: CPT | Performed by: INTERNAL MEDICINE

## 2019-06-12 RX ORDER — SERTRALINE HYDROCHLORIDE 25 MG/1
TABLET, FILM COATED ORAL
COMMUNITY
Start: 2019-06-09

## 2019-06-12 NOTE — PROGRESS NOTES
Subjective:      Patient ID: Sydnee Rubio is a 52 y.o. male. Chief Complaint   Patient presents with    Hypertension     Chronic Disease Visit Information    BP Readings from Last 3 Encounters:   05/01/19 124/84   09/04/18 108/62   03/16/18 108/60          Hemoglobin A1C (%)   Date Value   05/09/2018 5.7   04/14/2017 5.7   05/20/2016 5.4     LDL Cholesterol (mg/dL)   Date Value   05/09/2018 83     HDL (mg/dL)   Date Value   05/09/2018 38 (L)     BUN (mg/dL)   Date Value   05/09/2018 12     CREATININE (mg/dL)   Date Value   05/09/2018 0.83     Glucose (mg/dL)   Date Value   05/09/2018 106 (H)   04/18/2012 99            Have you changed or started any medications since your last visit including any over-the-counter medicines, vitamins, or herbal medicines? no   Are you having any side effects from any of your medications? -  no  Have you stopped taking any of your medications? Is so, why? -  no    Have you seen any other physician or provider since your last visit? Yes - Records Obtained  Have you had any other diagnostic tests since your last visit? No  Have you been seen in the emergency room and/or had an admission to a hospital since we last saw you? No  Have you had your annual diabetic retinal (eye) exam? No  Have you had your routine dental cleaning in the past 6 months? no    Have you activated your [employer] account? If not, what are your barriers?  Yes     Patient Care Team:  Sundar Ramirez MD as PCP - General (Internal Medicine)  Sundar Ramirez MD as PCP - [facility]         Medical History Review  Past Medical, Family, and Social History reviewed and does contribute to the patient presenting condition    Health Maintenance   Topic Date Due    Hepatitis C screen  1965    HIV screen  12/29/1978    Colon Cancer Screen FIT/FOBT  09/25/2016    A1C test (Diabetic or Prediabetic)  05/09/2019    Potassium monitoring  05/09/2019    Creatinine monitoring  05/09/2019    DTaP/Tdap/Td vaccine (1 - Tdap) 09/05/2019 (Originally 12/29/1982)    Shingles Vaccine (1 of 2) 09/13/2019 (Originally 12/29/2013)    Flu vaccine (Season Ended) 09/01/2019    TSH testing  09/04/2019    Lipid screen  05/09/2023    Pneumococcal 0-64 years Vaccine  Aged Out       Neurologic Problem   The patient's primary symptoms include a loss of balance. The patient's pertinent negatives include no weakness. Primary symptoms comment: has cerebral palsy. This is a chronic problem. The current episode started more than 1 year ago. Pertinent negatives include no fatigue, fever or shortness of breath. Review of Systems   Constitutional: Negative for fatigue and fever. Respiratory: Negative for shortness of breath. Gastrointestinal: Negative for diarrhea. He has soft and more frequent stools but he has no mucus or blood   Neurological: Positive for loss of balance. Negative for weakness. Objective:   Physical Exam   Constitutional: He is oriented to person, place, and time. He appears well-developed and well-nourished. He is active and cooperative. He does not have a sickly appearance. No distress. HENT:   Head: Normocephalic and atraumatic. Head is without abrasion, without contusion and without laceration. Right Ear: External ear normal.   Left Ear: External ear normal.   Nose: No mucosal edema, nose lacerations or septal deviation. Mouth/Throat: Oropharynx is clear and moist. Mucous membranes are not pale. No oropharyngeal exudate or posterior oropharyngeal edema. Eyes: Pupils are equal, round, and reactive to light. EOM are normal. Right eye exhibits no discharge. Left eye exhibits no discharge. No scleral icterus. Neck: Neck supple. No hepatojugular reflux present. Carotid bruit is not present. No thyroid mass and no thyromegaly present. Cardiovascular: Normal rate, regular rhythm and normal heart sounds. No murmur heard.   Pulmonary/Chest: Effort normal and breath sounds normal. He has no wheezes. He has no rales. Abdominal: Soft. He exhibits no distension, no ascites and no mass. There is no hepatosplenomegaly. There is no tenderness. There is no rigidity, no rebound and no CVA tenderness. No hernia. Hernia confirmed negative in the ventral area. Musculoskeletal: Normal range of motion. He exhibits no tenderness. Right lower leg: He exhibits edema. Left lower leg: He exhibits edema. Lymphadenopathy:     He has no cervical adenopathy. He has no axillary adenopathy. Right: No supraclavicular and no epitrochlear adenopathy present. Left: No supraclavicular and no epitrochlear adenopathy present. Neurological: He is alert and oriented to person, place, and time. He displays atrophy and tremor. No cranial nerve deficit. He exhibits abnormal muscle tone (cerebral palsy). He displays no seizure activity. Coordination and gait abnormal.   Has cerebral palsy     Skin: Skin is warm and dry. No bruising, no laceration, no petechiae and no rash noted. He is not diaphoretic. No cyanosis or erythema. No pallor. Nails show no clubbing. Psychiatric: He has a normal mood and affect. His speech is normal and behavior is normal. Judgment and thought content normal. Cognition and memory are normal.   Vitals reviewed. Assessment / Plan:   Lindsay Milner was seen today for hypertension. Diagnoses and all orders for this visit:    Essential hypertension, benign  -     CBC Auto Differential; Future  -     Comprehensive Metabolic Panel; Future  -     Lipid Panel  -     Sedimentation rate, automated; Future  -     Urinalysis with Microscopic; Future  -     Hemoglobin A1C; Future    Paranoid schizophrenia ([facility] [address]    Infantile cerebral palsy (HCC)    Acquired hypothyroidism    Mixed hyperlipidemia    Abnormal finding of blood chemistry   -     Hemoglobin A1C; Future       No follow-ups on file.   Orders Placed This Encounter   Procedures    CBC Auto Differential     Standing Status: Future     Standing Expiration Date:   6/11/2020    Comprehensive Metabolic Panel     Standing Status:   Future     Standing Expiration Date:   6/11/2020    Lipid Panel     Order Specific Question:   Is Patient Fasting?/# of Hours     Answer:   fasting    Sedimentation rate, automated     Standing Status:   Future     Standing Expiration Date:   6/11/2020    Urinalysis with Microscopic     Standing Status:   Future     Standing Expiration Date:   6/11/2020     Order Specific Question:   SPECIFY(EX-CATH,MIDSTREAM,CYSTO,ETC)? Answer:   clean catch    Hemoglobin A1C     Standing Status:   Future     Standing Expiration Date:   6/12/2020     No orders of the defined types were placed in this encounter.      Doing well and stable he was advised that he is due for repeat laboratory work-up

## 2019-06-13 LAB
ESTIMATED AVERAGE GLUCOSE: 111 MG/DL
HBA1C MFR BLD: 5.5 % (ref 4–6)

## 2019-06-13 ASSESSMENT — ENCOUNTER SYMPTOMS
SHORTNESS OF BREATH: 0
DIARRHEA: 0

## 2019-07-26 ENCOUNTER — TELEPHONE (OUTPATIENT)
Dept: INTERNAL MEDICINE CLINIC | Age: 54
End: 2019-07-26

## 2019-07-30 RX ORDER — FUROSEMIDE 20 MG/1
20 TABLET ORAL DAILY
Qty: 90 TABLET | Refills: 3 | Status: SHIPPED | OUTPATIENT
Start: 2019-07-30 | End: 2020-01-20 | Stop reason: SDUPTHER

## 2019-08-18 DIAGNOSIS — J06.9 UPPER RESPIRATORY TRACT INFECTION, UNSPECIFIED TYPE: ICD-10-CM

## 2019-08-19 RX ORDER — FUROSEMIDE 20 MG/1
20 TABLET ORAL DAILY
Qty: 90 TABLET | Refills: 0 | Status: SHIPPED | OUTPATIENT
Start: 2019-08-19 | End: 2019-12-12 | Stop reason: SDUPTHER

## 2019-08-19 RX ORDER — BENZONATATE 100 MG/1
CAPSULE ORAL
Qty: 60 CAPSULE | Refills: 0 | Status: SHIPPED | OUTPATIENT
Start: 2019-08-19

## 2019-12-11 ENCOUNTER — TELEPHONE (OUTPATIENT)
Dept: INTERNAL MEDICINE CLINIC | Age: 54
End: 2019-12-11

## 2019-12-13 RX ORDER — FUROSEMIDE 20 MG/1
20 TABLET ORAL DAILY
Qty: 90 TABLET | Refills: 0 | Status: SHIPPED | OUTPATIENT
Start: 2019-12-13 | End: 2020-03-26

## 2020-01-20 ENCOUNTER — OFFICE VISIT (OUTPATIENT)
Dept: INTERNAL MEDICINE CLINIC | Age: 55
End: 2020-01-20
Payer: MEDICARE

## 2020-01-20 VITALS
WEIGHT: 165 LBS | HEIGHT: 61 IN | DIASTOLIC BLOOD PRESSURE: 74 MMHG | HEART RATE: 90 BPM | OXYGEN SATURATION: 98 % | BODY MASS INDEX: 31.15 KG/M2 | SYSTOLIC BLOOD PRESSURE: 122 MMHG

## 2020-01-20 PROCEDURE — 99214 OFFICE O/P EST MOD 30 MIN: CPT | Performed by: INTERNAL MEDICINE

## 2020-01-20 PROCEDURE — 3017F COLORECTAL CA SCREEN DOC REV: CPT | Performed by: INTERNAL MEDICINE

## 2020-01-20 PROCEDURE — 1036F TOBACCO NON-USER: CPT | Performed by: INTERNAL MEDICINE

## 2020-01-20 PROCEDURE — G8417 CALC BMI ABV UP PARAM F/U: HCPCS | Performed by: INTERNAL MEDICINE

## 2020-01-20 PROCEDURE — G8484 FLU IMMUNIZE NO ADMIN: HCPCS | Performed by: INTERNAL MEDICINE

## 2020-01-20 PROCEDURE — G8427 DOCREV CUR MEDS BY ELIG CLIN: HCPCS | Performed by: INTERNAL MEDICINE

## 2020-01-20 RX ORDER — LEVOTHYROXINE SODIUM 0.05 MG/1
TABLET ORAL
Qty: 90 TABLET | Refills: 3 | Status: SHIPPED | OUTPATIENT
Start: 2020-01-20 | End: 2021-03-02 | Stop reason: SDUPTHER

## 2020-01-20 RX ORDER — LOVASTATIN 20 MG/1
TABLET ORAL
Qty: 90 TABLET | Refills: 3 | Status: SHIPPED | OUTPATIENT
Start: 2020-01-20 | End: 2021-03-02 | Stop reason: SDUPTHER

## 2020-01-20 RX ORDER — FUROSEMIDE 20 MG/1
20 TABLET ORAL DAILY
Qty: 90 TABLET | Refills: 3 | Status: SHIPPED | OUTPATIENT
Start: 2020-01-20 | End: 2021-03-02 | Stop reason: SDUPTHER

## 2020-01-20 ASSESSMENT — PATIENT HEALTH QUESTIONNAIRE - PHQ9
SUM OF ALL RESPONSES TO PHQ9 QUESTIONS 1 & 2: 0
SUM OF ALL RESPONSES TO PHQ QUESTIONS 1-9: 0
1. LITTLE INTEREST OR PLEASURE IN DOING THINGS: 0
SUM OF ALL RESPONSES TO PHQ QUESTIONS 1-9: 0
2. FEELING DOWN, DEPRESSED OR HOPELESS: 0

## 2020-01-20 ASSESSMENT — ENCOUNTER SYMPTOMS
DIARRHEA: 0
SHORTNESS OF BREATH: 0

## 2020-01-20 NOTE — PROGRESS NOTES
Visit Information    Have you changed or started any medications since your last visit including any over-the-counter medicines, vitamins, or herbal medicines? no   Are you having any side effects from any of your medications? -  no  Have you stopped taking any of your medications? Is so, why? -  no    Have you seen any other physician or provider since your last visit? No  Have you had any other diagnostic tests since your last visit? No  Have you been seen in the emergency room and/or had an admission to a hospital since we last saw you? No  Have you had your routine dental cleaning in the past 6 months? no    Have you activated your [employer] account? If not, what are your barriers?  Yes     Patient Care Team:  Mat Banks MD as PCP - General (Internal Medicine)  Mat Banks MD as PCP - [facility]    Medical History Review  Past Medical, Family, and Social History reviewed and does not contribute to the patient presenting condition    Health Maintenance   Topic Date Due    Hepatitis C screen  1965    DTaP/Tdap/Td vaccine (1 - Tdap) 12/29/1974    HIV screen  12/29/1978    Shingles Vaccine (1 of 2) 12/29/2013    Colon Cancer Screen FIT/FOBT  09/25/2016    Lipid screen  05/09/2019    Annual Wellness Visit (AWV)  05/29/2019    Flu vaccine (1) 09/01/2019    TSH testing  09/04/2019    Potassium monitoring  06/12/2020    Creatinine monitoring  06/12/2020    Pneumococcal 0-64 years Vaccine  Aged Out
Abdomen is not rigid. There is no mass. Tenderness: There is no tenderness. There is no rebound. Hernia: No hernia is present. There is no hernia in the ventral area. Musculoskeletal: Normal range of motion. General: No tenderness. Right lower leg: Edema present. Left lower leg: Edema present. Lymphadenopathy:      Cervical: No cervical adenopathy. Upper Body:      Right upper body: No supraclavicular or epitrochlear adenopathy. Left upper body: No supraclavicular or epitrochlear adenopathy. Skin:     General: Skin is warm and dry. Coloration: Skin is not pale. Findings: No bruising, erythema, laceration, petechiae or rash. Nails: There is no clubbing. Neurological:      Mental Status: He is alert and oriented to person, place, and time. Cranial Nerves: No cranial nerve deficit. Motor: Tremor, atrophy and abnormal muscle tone (cerebral palsy) present. No seizure activity. Coordination: Coordination abnormal.      Gait: Gait abnormal.      Comments: Has cerebral palsy     Psychiatric:         Speech: Speech normal.         Behavior: Behavior normal. Behavior is cooperative. Thought Content: Thought content normal.         Judgment: Judgment normal.         Assessment / Plan:   Bri Moore was seen today for hypertension. Diagnoses and all orders for this visit:    Screening for hyperlipidemia    Screening for malignant neoplasm of colon    Essential hypertension, benign= well controlled     Functional diarrhea= it has improved     Acquired hypothyroidism= on therapy    Infantile cerebral palsy (HCC)= no change     [facility]    Mixed hyperlipidemia       Return in about 12 weeks (around 4/13/2020) for Follow Up, hypertension. No orders of the defined types were placed in this encounter. No orders of the defined types were placed in this encounter.

## 2020-03-26 RX ORDER — FUROSEMIDE 20 MG/1
20 TABLET ORAL DAILY
Qty: 90 TABLET | Refills: 0 | Status: SHIPPED | OUTPATIENT
Start: 2020-03-26 | End: 2020-06-24 | Stop reason: SDUPTHER

## 2020-04-17 ENCOUNTER — TELEPHONE (OUTPATIENT)
Dept: INTERNAL MEDICINE CLINIC | Age: 55
End: 2020-04-17

## 2020-06-24 ENCOUNTER — HOSPITAL ENCOUNTER (OUTPATIENT)
Age: 55
Setting detail: SPECIMEN
Discharge: HOME | End: 2020-06-24
Payer: MEDICARE

## 2020-06-24 ENCOUNTER — OFFICE VISIT (OUTPATIENT)
Dept: INTERNAL MEDICINE CLINIC | Age: 55
End: 2020-06-24
Payer: MEDICARE

## 2020-06-24 VITALS
RESPIRATION RATE: 14 BRPM | BODY MASS INDEX: 31.34 KG/M2 | TEMPERATURE: 98.3 F | DIASTOLIC BLOOD PRESSURE: 80 MMHG | SYSTOLIC BLOOD PRESSURE: 118 MMHG | HEIGHT: 61 IN | HEART RATE: 76 BPM | WEIGHT: 166 LBS

## 2020-06-24 LAB
-: ABNORMAL
ABSOLUTE EOS #: 0.07 K/UL (ref 0–0.44)
ABSOLUTE IMMATURE GRANULOCYTE: <0.03 K/UL (ref 0–0.3)
ABSOLUTE LYMPH #: 0.93 K/UL (ref 1.1–3.7)
ABSOLUTE MONO #: 0.46 K/UL (ref 0.1–1.2)
ALBUMIN SERPL-MCNC: 4.4 G/DL (ref 3.5–5.2)
ALBUMIN/GLOBULIN RATIO: 1.4 (ref 1–2.5)
ALP BLD-CCNC: 81 U/L (ref 40–129)
ALT SERPL-CCNC: 28 U/L (ref 5–41)
AMORPHOUS: ABNORMAL
ANION GAP SERPL CALCULATED.3IONS-SCNC: 17 MMOL/L (ref 9–17)
AST SERPL-CCNC: 21 U/L
BACTERIA: ABNORMAL
BASOPHILS # BLD: 1 % (ref 0–2)
BASOPHILS ABSOLUTE: 0.03 K/UL (ref 0–0.2)
BILIRUB SERPL-MCNC: 0.65 MG/DL (ref 0.3–1.2)
BILIRUBIN URINE: NEGATIVE
BUN BLDV-MCNC: 16 MG/DL (ref 6–20)
BUN/CREAT BLD: ABNORMAL (ref 9–20)
C-REACTIVE PROTEIN: 2.9 MG/L (ref 0–5)
CALCIUM SERPL-MCNC: 9 MG/DL (ref 8.6–10.4)
CASTS UA: ABNORMAL /LPF (ref 0–8)
CHLORIDE BLD-SCNC: 106 MMOL/L (ref 98–107)
CHOLESTEROL/HDL RATIO: 4.9
CHOLESTEROL: 173 MG/DL
CO2: 22 MMOL/L (ref 20–31)
COLOR: YELLOW
CREAT SERPL-MCNC: 1.02 MG/DL (ref 0.7–1.2)
CRYSTALS, UA: ABNORMAL /HPF
DIFFERENTIAL TYPE: ABNORMAL
EOSINOPHILS RELATIVE PERCENT: 1 % (ref 1–4)
EPITHELIAL CELLS UA: ABNORMAL /HPF (ref 0–5)
GFR AFRICAN AMERICAN: >60 ML/MIN
GFR NON-AFRICAN AMERICAN: >60 ML/MIN
GFR SERPL CREATININE-BSD FRML MDRD: ABNORMAL ML/MIN/{1.73_M2}
GFR SERPL CREATININE-BSD FRML MDRD: ABNORMAL ML/MIN/{1.73_M2}
GLUCOSE BLD-MCNC: 98 MG/DL (ref 70–99)
GLUCOSE URINE: NEGATIVE
HCT VFR BLD CALC: 43.2 % (ref 40.7–50.3)
HDLC SERPL-MCNC: 35 MG/DL
HEMOGLOBIN: 14.3 G/DL (ref 13–17)
IMMATURE GRANULOCYTES: 0 %
KETONES, URINE: NEGATIVE
LDL CHOLESTEROL: 83 MG/DL (ref 0–130)
LEUKOCYTE ESTERASE, URINE: NEGATIVE
LYMPHOCYTES # BLD: 16 % (ref 24–43)
MCH RBC QN AUTO: 30.1 PG (ref 25.2–33.5)
MCHC RBC AUTO-ENTMCNC: 33.1 G/DL (ref 28.4–34.8)
MCV RBC AUTO: 90.9 FL (ref 82.6–102.9)
MONOCYTES # BLD: 8 % (ref 3–12)
MUCUS: ABNORMAL
NITRITE, URINE: NEGATIVE
NRBC AUTOMATED: 0 PER 100 WBC
OTHER OBSERVATIONS UA: ABNORMAL
PDW BLD-RTO: 12.9 % (ref 11.8–14.4)
PH UA: 5 (ref 5–8)
PLATELET # BLD: 225 K/UL (ref 138–453)
PLATELET ESTIMATE: ABNORMAL
PMV BLD AUTO: 10.1 FL (ref 8.1–13.5)
POTASSIUM SERPL-SCNC: 4.5 MMOL/L (ref 3.7–5.3)
PROSTATE SPECIFIC ANTIGEN: 0.55 UG/L
PROTEIN UA: NEGATIVE
RBC # BLD: 4.75 M/UL (ref 4.21–5.77)
RBC # BLD: ABNORMAL 10*6/UL
RBC UA: ABNORMAL /HPF (ref 0–4)
RENAL EPITHELIAL, UA: ABNORMAL /HPF
SEG NEUTROPHILS: 74 % (ref 36–65)
SEGMENTED NEUTROPHILS ABSOLUTE COUNT: 4.46 K/UL (ref 1.5–8.1)
SODIUM BLD-SCNC: 145 MMOL/L (ref 135–144)
SPECIFIC GRAVITY UA: 1.02 (ref 1–1.03)
TOTAL PROTEIN: 7.6 G/DL (ref 6.4–8.3)
TRICHOMONAS: ABNORMAL
TRIGL SERPL-MCNC: 273 MG/DL
TSH SERPL DL<=0.05 MIU/L-ACNC: 5.34 MIU/L (ref 0.3–5)
TURBIDITY: ABNORMAL
URINE HGB: NEGATIVE
UROBILINOGEN, URINE: NORMAL
VLDLC SERPL CALC-MCNC: ABNORMAL MG/DL (ref 1–30)
WBC # BLD: 6 K/UL (ref 3.5–11.3)
WBC # BLD: ABNORMAL 10*3/UL
WBC UA: ABNORMAL /HPF (ref 0–5)
YEAST: ABNORMAL

## 2020-06-24 PROCEDURE — G8417 CALC BMI ABV UP PARAM F/U: HCPCS | Performed by: INTERNAL MEDICINE

## 2020-06-24 PROCEDURE — 3017F COLORECTAL CA SCREEN DOC REV: CPT | Performed by: INTERNAL MEDICINE

## 2020-06-24 PROCEDURE — G8427 DOCREV CUR MEDS BY ELIG CLIN: HCPCS | Performed by: INTERNAL MEDICINE

## 2020-06-24 PROCEDURE — 99214 OFFICE O/P EST MOD 30 MIN: CPT | Performed by: INTERNAL MEDICINE

## 2020-06-24 PROCEDURE — 1036F TOBACCO NON-USER: CPT | Performed by: INTERNAL MEDICINE

## 2020-06-24 ASSESSMENT — PATIENT HEALTH QUESTIONNAIRE - PHQ9
SUM OF ALL RESPONSES TO PHQ QUESTIONS 1-9: 0
SUM OF ALL RESPONSES TO PHQ QUESTIONS 1-9: 0
2. FEELING DOWN, DEPRESSED OR HOPELESS: 0
SUM OF ALL RESPONSES TO PHQ9 QUESTIONS 1 & 2: 0
1. LITTLE INTEREST OR PLEASURE IN DOING THINGS: 0

## 2020-06-24 NOTE — PROGRESS NOTES
Subjective:      Patient ID: Flor Mcneill is a 62 y.o. male. He has no complaints today    Hypertension   Pertinent negatives include no shortness of breath. Neurologic Problem   The patient's primary symptoms include a loss of balance. The patient's pertinent negatives include no weakness. Primary symptoms comment: has cerebral palsy. This is a chronic problem. The current episode started more than 1 year ago. Pertinent negatives include no fatigue, fever or shortness of breath. Review of Systems   Constitutional: Negative for fatigue and fever. Respiratory: Negative for shortness of breath. Gastrointestinal: Negative for diarrhea. He has soft and more frequent stools but he has no mucus or blood   Neurological: Positive for loss of balance. Negative for weakness. Objective:   Physical Exam  Vitals signs reviewed. Constitutional:       General: He is not in acute distress. Appearance: He is well-developed. He is not diaphoretic. HENT:      Head: Normocephalic and atraumatic. No abrasion, contusion or laceration. Right Ear: External ear normal.      Left Ear: External ear normal.      Nose: No septal deviation, laceration or mucosal edema. Mouth/Throat:      Mouth: Mucous membranes are not pale. Pharynx: No oropharyngeal exudate. Eyes:      General: No scleral icterus. Right eye: No discharge. Left eye: No discharge. Pupils: Pupils are equal, round, and reactive to light. Neck:      Musculoskeletal: Neck supple. Thyroid: No thyroid mass or thyromegaly. Vascular: No carotid bruit or hepatojugular reflux. Cardiovascular:      Rate and Rhythm: Normal rate and regular rhythm. Heart sounds: Normal heart sounds. No murmur. Pulmonary:      Effort: Pulmonary effort is normal.      Breath sounds: Normal breath sounds. No wheezing or rales. Abdominal:      General: There is no distension. Palpations: Abdomen is soft.

## 2020-06-25 ASSESSMENT — ENCOUNTER SYMPTOMS
DIARRHEA: 0
SHORTNESS OF BREATH: 0

## 2021-03-02 ENCOUNTER — OFFICE VISIT (OUTPATIENT)
Dept: INTERNAL MEDICINE CLINIC | Age: 56
End: 2021-03-02
Payer: MEDICARE

## 2021-03-02 ENCOUNTER — HOSPITAL ENCOUNTER (OUTPATIENT)
Age: 56
Setting detail: SPECIMEN
Discharge: HOME | End: 2021-03-02
Payer: MEDICARE

## 2021-03-02 VITALS
TEMPERATURE: 97 F | OXYGEN SATURATION: 96 % | DIASTOLIC BLOOD PRESSURE: 74 MMHG | HEART RATE: 64 BPM | SYSTOLIC BLOOD PRESSURE: 120 MMHG | BODY MASS INDEX: 29.68 KG/M2 | WEIGHT: 157 LBS

## 2021-03-02 DIAGNOSIS — Z12.5 ENCOUNTER FOR SCREENING FOR MALIGNANT NEOPLASM OF PROSTATE: ICD-10-CM

## 2021-03-02 DIAGNOSIS — E78.2 MIXED HYPERLIPIDEMIA: ICD-10-CM

## 2021-03-02 DIAGNOSIS — F20.0 PARANOID SCHIZOPHRENIA (HCC): ICD-10-CM

## 2021-03-02 DIAGNOSIS — G80.9 INFANTILE CEREBRAL PALSY (HCC): Primary | ICD-10-CM

## 2021-03-02 DIAGNOSIS — I10 ESSENTIAL HYPERTENSION, BENIGN: ICD-10-CM

## 2021-03-02 DIAGNOSIS — Z00.00 ENCOUNTER FOR ANNUAL WELLNESS EXAM IN MEDICARE PATIENT: ICD-10-CM

## 2021-03-02 LAB
-: ABNORMAL
ABSOLUTE EOS #: 0.08 K/UL (ref 0–0.44)
ABSOLUTE IMMATURE GRANULOCYTE: <0.03 K/UL (ref 0–0.3)
ABSOLUTE LYMPH #: 0.9 K/UL (ref 1.1–3.7)
ABSOLUTE MONO #: 0.41 K/UL (ref 0.1–1.2)
ALBUMIN SERPL-MCNC: 4.6 G/DL (ref 3.5–5.2)
ALBUMIN/GLOBULIN RATIO: 1.4 (ref 1–2.5)
ALP BLD-CCNC: 77 U/L (ref 40–129)
ALT SERPL-CCNC: 28 U/L (ref 5–41)
AMORPHOUS: ABNORMAL
ANION GAP SERPL CALCULATED.3IONS-SCNC: 16 MMOL/L (ref 9–17)
AST SERPL-CCNC: 20 U/L
BACTERIA: ABNORMAL
BASOPHILS # BLD: 1 % (ref 0–2)
BASOPHILS ABSOLUTE: 0.05 K/UL (ref 0–0.2)
BILIRUB SERPL-MCNC: 0.5 MG/DL (ref 0.3–1.2)
BILIRUBIN URINE: NEGATIVE
BUN BLDV-MCNC: 15 MG/DL (ref 6–20)
BUN/CREAT BLD: ABNORMAL (ref 9–20)
C-REACTIVE PROTEIN: 3.8 MG/L (ref 0–5)
CALCIUM SERPL-MCNC: 9.3 MG/DL (ref 8.6–10.4)
CASTS UA: ABNORMAL /LPF (ref 0–8)
CHLORIDE BLD-SCNC: 108 MMOL/L (ref 98–107)
CHOLESTEROL/HDL RATIO: 4.5
CHOLESTEROL: 176 MG/DL
CO2: 21 MMOL/L (ref 20–31)
COLOR: YELLOW
CREAT SERPL-MCNC: 0.96 MG/DL (ref 0.7–1.2)
CRYSTALS, UA: ABNORMAL /HPF
DIFFERENTIAL TYPE: ABNORMAL
EOSINOPHILS RELATIVE PERCENT: 2 % (ref 1–4)
EPITHELIAL CELLS UA: ABNORMAL /HPF (ref 0–5)
GFR AFRICAN AMERICAN: >60 ML/MIN
GFR NON-AFRICAN AMERICAN: >60 ML/MIN
GFR SERPL CREATININE-BSD FRML MDRD: ABNORMAL ML/MIN/{1.73_M2}
GFR SERPL CREATININE-BSD FRML MDRD: ABNORMAL ML/MIN/{1.73_M2}
GLUCOSE BLD-MCNC: 105 MG/DL (ref 70–99)
GLUCOSE URINE: NEGATIVE
HCT VFR BLD CALC: 43.5 % (ref 40.7–50.3)
HDLC SERPL-MCNC: 39 MG/DL
HEMOGLOBIN: 14.1 G/DL (ref 13–17)
IMMATURE GRANULOCYTES: 0 %
KETONES, URINE: NEGATIVE
LDL CHOLESTEROL: 92 MG/DL (ref 0–130)
LEUKOCYTE ESTERASE, URINE: NEGATIVE
LYMPHOCYTES # BLD: 17 % (ref 24–43)
MCH RBC QN AUTO: 29.6 PG (ref 25.2–33.5)
MCHC RBC AUTO-ENTMCNC: 32.4 G/DL (ref 28.4–34.8)
MCV RBC AUTO: 91.4 FL (ref 82.6–102.9)
MONOCYTES # BLD: 8 % (ref 3–12)
MUCUS: ABNORMAL
NITRITE, URINE: NEGATIVE
NRBC AUTOMATED: 0 PER 100 WBC
OTHER OBSERVATIONS UA: ABNORMAL
PDW BLD-RTO: 12.9 % (ref 11.8–14.4)
PH UA: 5 (ref 5–8)
PLATELET # BLD: 218 K/UL (ref 138–453)
PLATELET ESTIMATE: ABNORMAL
PMV BLD AUTO: 9.9 FL (ref 8.1–13.5)
POTASSIUM SERPL-SCNC: 4.5 MMOL/L (ref 3.7–5.3)
PROSTATE SPECIFIC ANTIGEN: 1.93 UG/L
PROTEIN UA: NEGATIVE
RBC # BLD: 4.76 M/UL (ref 4.21–5.77)
RBC # BLD: ABNORMAL 10*6/UL
RBC UA: ABNORMAL /HPF (ref 0–4)
RENAL EPITHELIAL, UA: ABNORMAL /HPF
SEDIMENTATION RATE, ERYTHROCYTE: 11 MM (ref 0–20)
SEG NEUTROPHILS: 72 % (ref 36–65)
SEGMENTED NEUTROPHILS ABSOLUTE COUNT: 4.02 K/UL (ref 1.5–8.1)
SODIUM BLD-SCNC: 145 MMOL/L (ref 135–144)
SPECIFIC GRAVITY UA: 1.02 (ref 1–1.03)
TOTAL PROTEIN: 7.9 G/DL (ref 6.4–8.3)
TRICHOMONAS: ABNORMAL
TRIGL SERPL-MCNC: 224 MG/DL
TSH SERPL DL<=0.05 MIU/L-ACNC: 4.08 MIU/L (ref 0.3–5)
TURBIDITY: ABNORMAL
URINE HGB: NEGATIVE
UROBILINOGEN, URINE: NORMAL
VLDLC SERPL CALC-MCNC: ABNORMAL MG/DL (ref 1–30)
WBC # BLD: 5.5 K/UL (ref 3.5–11.3)
WBC # BLD: ABNORMAL 10*3/UL
WBC UA: ABNORMAL /HPF (ref 0–5)
YEAST: ABNORMAL

## 2021-03-02 PROCEDURE — G8484 FLU IMMUNIZE NO ADMIN: HCPCS | Performed by: INTERNAL MEDICINE

## 2021-03-02 PROCEDURE — G0439 PPPS, SUBSEQ VISIT: HCPCS | Performed by: INTERNAL MEDICINE

## 2021-03-02 PROCEDURE — 3017F COLORECTAL CA SCREEN DOC REV: CPT | Performed by: INTERNAL MEDICINE

## 2021-03-02 RX ORDER — LEVOTHYROXINE SODIUM 0.05 MG/1
TABLET ORAL
Qty: 90 TABLET | Refills: 3 | Status: SHIPPED | OUTPATIENT
Start: 2021-03-02 | End: 2021-04-12 | Stop reason: SDUPTHER

## 2021-03-02 RX ORDER — LOVASTATIN 20 MG/1
TABLET ORAL
Qty: 90 TABLET | Refills: 3 | Status: SHIPPED | OUTPATIENT
Start: 2021-03-02 | End: 2021-07-12 | Stop reason: SDUPTHER

## 2021-03-02 RX ORDER — FUROSEMIDE 20 MG/1
20 TABLET ORAL DAILY
Qty: 90 TABLET | Refills: 3 | Status: SHIPPED | OUTPATIENT
Start: 2021-03-02 | End: 2021-04-12

## 2021-03-02 ASSESSMENT — PATIENT HEALTH QUESTIONNAIRE - PHQ9
5. POOR APPETITE OR OVEREATING: 0
SUM OF ALL RESPONSES TO PHQ QUESTIONS 1-9: 2
SUM OF ALL RESPONSES TO PHQ QUESTIONS 1-9: 4
2. FEELING DOWN, DEPRESSED OR HOPELESS: 1
1. LITTLE INTEREST OR PLEASURE IN DOING THINGS: 1
7. TROUBLE CONCENTRATING ON THINGS, SUCH AS READING THE NEWSPAPER OR WATCHING TELEVISION: 0
8. MOVING OR SPEAKING SO SLOWLY THAT OTHER PEOPLE COULD HAVE NOTICED. OR THE OPPOSITE, BEING SO FIGETY OR RESTLESS THAT YOU HAVE BEEN MOVING AROUND A LOT MORE THAN USUAL: 0
SUM OF ALL RESPONSES TO PHQ QUESTIONS 1-9: 4
6. FEELING BAD ABOUT YOURSELF - OR THAT YOU ARE A FAILURE OR HAVE LET YOURSELF OR YOUR FAMILY DOWN: 0
SUM OF ALL RESPONSES TO PHQ9 QUESTIONS 1 & 2: 2

## 2021-03-02 ASSESSMENT — ENCOUNTER SYMPTOMS
DIARRHEA: 0
SHORTNESS OF BREATH: 0

## 2021-03-02 NOTE — PROGRESS NOTES
Subjective:      Patient ID: Valdez Harvey is a 60 y.o. male. The patient is asymptomatic today. His memory is normal.  He is current with his influenza, pneumonia vaccine. He gets regular Cologuard testing he does not want colonoscopic exam.  He has been treated with adiel Drummond for his schizophrenia mixed hyperlipidemia and hypertension. He has abnormal gait due to infantile cerebral palsy      Review of Systems   Constitutional: Negative for fatigue and fever. Respiratory: Negative for shortness of breath. Gastrointestinal: Negative for diarrhea. He has soft and more frequent stools but he has no mucus or blood   Neurological: Negative for weakness. Objective:   Physical Exam  Vitals signs reviewed. Constitutional:       General: He is not in acute distress. Appearance: He is well-developed. He is not diaphoretic. HENT:      Head: Normocephalic and atraumatic. No abrasion, contusion or laceration. Right Ear: External ear normal.      Left Ear: External ear normal.      Nose: No septal deviation, laceration or mucosal edema. Mouth/Throat:      Mouth: Mucous membranes are not pale. Pharynx: No oropharyngeal exudate. Eyes:      General: No scleral icterus. Right eye: No discharge. Left eye: No discharge. Pupils: Pupils are equal, round, and reactive to light. Neck:      Musculoskeletal: Neck supple. Thyroid: No thyroid mass or thyromegaly. Vascular: No carotid bruit or hepatojugular reflux. Cardiovascular:      Rate and Rhythm: Normal rate and regular rhythm. Heart sounds: Normal heart sounds. No murmur. Pulmonary:      Effort: Pulmonary effort is normal.      Breath sounds: Normal breath sounds. No wheezing or rales. Abdominal:      General: There is no distension. Palpations: Abdomen is soft. Abdomen is not rigid. There is no mass. Tenderness: There is no abdominal tenderness. There is no rebound.       Hernia: No hernia is present. There is no hernia in the ventral area. Musculoskeletal: Normal range of motion. General: No tenderness. Right lower leg: Edema present. Left lower leg: Edema present. Lymphadenopathy:      Cervical: No cervical adenopathy. Upper Body:      Right upper body: No supraclavicular or epitrochlear adenopathy. Left upper body: No supraclavicular or epitrochlear adenopathy. Skin:     General: Skin is warm and dry. Coloration: Skin is not pale. Findings: No bruising, erythema, laceration, petechiae or rash. Nails: There is no clubbing. Neurological:      Mental Status: He is alert and oriented to person, place, and time. Cranial Nerves: No cranial nerve deficit. Motor: Tremor, atrophy and abnormal muscle tone (cerebral palsy) present. No seizure activity. Coordination: Coordination abnormal.      Gait: Gait abnormal.      Comments: Has cerebral palsy     Psychiatric:         Speech: Speech normal.         Behavior: Behavior normal. Behavior is cooperative. Thought Content: Thought content normal.         Judgment: Judgment normal.         Assessment / Plan:      Diagnosis Orders   1. Infantile cerebral palsy ([facility]     2. Paranoid schizophrenia ([facility] = 2 for repeat laboratory work-up for drug monitoring      3. Essential hypertension, benign = controlled furosemide (LASIX) 20 MG tablet    levothyroxine (SYNTHROID) 50 MCG tablet    lovastatin (MEVACOR) 20 MG tablet   4. Mixed hyperlipidemia = due for repeat LDL    5. Encounter for annual wellness exam in Medicare patient = my notes      Return in about 4 months (around 7/2/2021) for Follow Up, hypertension, hyperlipidemia.   Orders Placed This Encounter   Procedures    CBC Auto Differential     Standing Status:   Future     Standing Expiration Date:   3/2/2022    Comprehensive Metabolic Panel     Standing Status:   Future     Standing Expiration Date:   3/2/2022    Urinalysis with Microscopic     Standing Status:   Future     Standing Expiration Date:   3/2/2022     Order Specific Question:   SPECIFY(EX-CATH,MIDSTREAM,CYSTO,ETC)? Answer:   clean catch    Lipid Panel     Order Specific Question:   Is Patient Fasting?/# of Hours     Answer:   fastin g    Sedimentation rate, automated     Standing Status:   Future     Standing Expiration Date:   3/2/2022    TSH without Reflex     Standing Status:   Future     Standing Expiration Date:   3/2/2022    PSA screening     Standing Status:   Future     Standing Expiration Date:   3/2/2022    C-Reactive Protein     Standing Status:   Future     Standing Expiration Date:   3/2/2022     Orders Placed This Encounter   Medications    furosemide (LASIX) 20 MG tablet     Sig: Take 1 tablet by mouth daily     Dispense:  90 tablet     Refill:  3    levothyroxine (SYNTHROID) 50 MCG tablet     Sig: TAKE 1 TABLET BY MOUTH EVERY DAY     Dispense:  90 tablet     Refill:  3    lovastatin (MEVACOR) 20 MG tablet     Sig: TAKE 1 TABLET BY MOUTH EVERY NIGHT     Dispense:  90 tablet     Refill:  3         Visit Information    Have you changed or started any medications since your last visit including any over-the-counter medicines, vitamins, or herbal medicines? no   Are you having any side effects from any of your medications? -  no  Have you stopped taking any of your medications? Is so, why? -  no    Have you seen any other physician or provider since your last visit? No  Have you had any other diagnostic tests since your last visit? No  Have you been seen in the emergency room and/or had an admission to a hospital since we last saw you? No  Have you had your routine dental cleaning in the past 6 months? no    Have you activated your [employer] account? If not, what are your barriers?  Yes     Patient Care Team:  Cathie Mac MD as PCP - General (Internal Medicine)  Cathie Mac MD as PCP - [facility]    Medical History Review  Past Medical, Family, and Social History reviewed and does not contribute to the patient presenting condition    Health Maintenance   Topic Date Due    Hepatitis C screen  Never done    HIV screen  Never done    COVID-19 Vaccine (1 of 2) Never done    DTaP/Tdap/Td vaccine (1 - Tdap) Never done    Shingles Vaccine (1 of 2) Never done   [employer] Visit (AWV)  Never done    Flu vaccine (1) Never done    Lipid screen  06/24/2021    TSH testing  06/24/2021    Potassium monitoring  06/24/2021    Creatinine monitoring  06/24/2021    Colon cancer screen fecal DNA test (Cologuard)  11/24/2023    Hepatitis A vaccine  Aged Out    Hepatitis B vaccine  Aged Out    Hib vaccine  Aged Out    Meningococcal (ACWY) vaccine  Aged Out    Pneumococcal 0-64 years Vaccine  Aged Antwon

## 2021-04-10 DIAGNOSIS — I10 ESSENTIAL HYPERTENSION, BENIGN: ICD-10-CM

## 2021-04-12 RX ORDER — FUROSEMIDE 20 MG/1
20 TABLET ORAL DAILY
Qty: 90 TABLET | Refills: 3 | Status: SHIPPED | OUTPATIENT
Start: 2021-04-12 | End: 2021-07-12 | Stop reason: SDUPTHER

## 2021-04-12 RX ORDER — LEVOTHYROXINE SODIUM 0.05 MG/1
TABLET ORAL
Qty: 90 TABLET | Refills: 3 | Status: SHIPPED | OUTPATIENT
Start: 2021-04-12 | End: 2021-07-12 | Stop reason: SDUPTHER

## 2021-04-16 ENCOUNTER — IMMUNIZATION (OUTPATIENT)
Dept: INTERNAL MEDICINE CLINIC | Age: 56
End: 2021-04-16
Payer: MEDICARE

## 2021-04-16 PROCEDURE — 0001A COVID-19, PFIZER VACCINE 30MCG/0.3ML DOSE: CPT | Performed by: INTERNAL MEDICINE

## 2021-04-16 PROCEDURE — 91300 COVID-19, PFIZER VACCINE 30MCG/0.3ML DOSE: CPT | Performed by: INTERNAL MEDICINE

## 2021-05-07 ENCOUNTER — IMMUNIZATION (OUTPATIENT)
Dept: INTERNAL MEDICINE CLINIC | Age: 56
End: 2021-05-07
Payer: MEDICARE

## 2021-05-07 PROCEDURE — 0002A COVID-19, PFIZER VACCINE 30MCG/0.3ML DOSE: CPT | Performed by: INTERNAL MEDICINE

## 2021-05-07 PROCEDURE — 91300 COVID-19, PFIZER VACCINE 30MCG/0.3ML DOSE: CPT | Performed by: INTERNAL MEDICINE

## 2021-07-12 ENCOUNTER — OFFICE VISIT (OUTPATIENT)
Dept: INTERNAL MEDICINE CLINIC | Age: 56
End: 2021-07-12
Payer: MEDICARE

## 2021-07-12 VITALS
SYSTOLIC BLOOD PRESSURE: 116 MMHG | BODY MASS INDEX: 33.08 KG/M2 | OXYGEN SATURATION: 96 % | WEIGHT: 175 LBS | HEART RATE: 76 BPM | DIASTOLIC BLOOD PRESSURE: 76 MMHG

## 2021-07-12 DIAGNOSIS — F20.0 PARANOID SCHIZOPHRENIA (HCC): ICD-10-CM

## 2021-07-12 DIAGNOSIS — G80.9 INFANTILE CEREBRAL PALSY (HCC): Primary | ICD-10-CM

## 2021-07-12 DIAGNOSIS — E78.2 MIXED HYPERLIPIDEMIA: ICD-10-CM

## 2021-07-12 DIAGNOSIS — I10 ESSENTIAL HYPERTENSION, BENIGN: ICD-10-CM

## 2021-07-12 PROCEDURE — G8427 DOCREV CUR MEDS BY ELIG CLIN: HCPCS | Performed by: INTERNAL MEDICINE

## 2021-07-12 PROCEDURE — G8417 CALC BMI ABV UP PARAM F/U: HCPCS | Performed by: INTERNAL MEDICINE

## 2021-07-12 PROCEDURE — 99214 OFFICE O/P EST MOD 30 MIN: CPT | Performed by: INTERNAL MEDICINE

## 2021-07-12 PROCEDURE — 1036F TOBACCO NON-USER: CPT | Performed by: INTERNAL MEDICINE

## 2021-07-12 PROCEDURE — 3017F COLORECTAL CA SCREEN DOC REV: CPT | Performed by: INTERNAL MEDICINE

## 2021-07-12 RX ORDER — LOVASTATIN 20 MG/1
TABLET ORAL
Qty: 90 TABLET | Refills: 3 | Status: SHIPPED | OUTPATIENT
Start: 2021-07-12 | End: 2021-10-12 | Stop reason: SDUPTHER

## 2021-07-12 RX ORDER — FUROSEMIDE 20 MG/1
20 TABLET ORAL DAILY
Qty: 90 TABLET | Refills: 3 | Status: SHIPPED | OUTPATIENT
Start: 2021-07-12 | End: 2021-10-12 | Stop reason: SDUPTHER

## 2021-07-12 RX ORDER — LEVOTHYROXINE SODIUM 0.05 MG/1
TABLET ORAL
Qty: 90 TABLET | Refills: 3 | Status: SHIPPED | OUTPATIENT
Start: 2021-07-12 | End: 2021-10-12 | Stop reason: SDUPTHER

## 2021-07-12 SDOH — ECONOMIC STABILITY: TRANSPORTATION INSECURITY
IN THE PAST 12 MONTHS, HAS THE LACK OF TRANSPORTATION KEPT YOU FROM MEDICAL APPOINTMENTS OR FROM GETTING MEDICATIONS?: NO

## 2021-07-12 SDOH — ECONOMIC STABILITY: FOOD INSECURITY: WITHIN THE PAST 12 MONTHS, YOU WORRIED THAT YOUR FOOD WOULD RUN OUT BEFORE YOU GOT MONEY TO BUY MORE.: NEVER TRUE

## 2021-07-12 SDOH — ECONOMIC STABILITY: FOOD INSECURITY: WITHIN THE PAST 12 MONTHS, THE FOOD YOU BOUGHT JUST DIDN'T LAST AND YOU DIDN'T HAVE MONEY TO GET MORE.: NEVER TRUE

## 2021-07-12 SDOH — ECONOMIC STABILITY: TRANSPORTATION INSECURITY
IN THE PAST 12 MONTHS, HAS LACK OF TRANSPORTATION KEPT YOU FROM MEETINGS, WORK, OR FROM GETTING THINGS NEEDED FOR DAILY LIVING?: NO

## 2021-07-12 ASSESSMENT — ENCOUNTER SYMPTOMS
DIARRHEA: 0
SHORTNESS OF BREATH: 0

## 2021-07-12 ASSESSMENT — PATIENT HEALTH QUESTIONNAIRE - PHQ9
1. LITTLE INTEREST OR PLEASURE IN DOING THINGS: NOT AT ALL
DEPRESSION UNABLE TO ASSESS: YES
SUM OF ALL RESPONSES TO PHQ9 QUESTIONS 1 & 2: 0
2. FEELING DOWN, DEPRESSED OR HOPELESS: NOT AT ALL

## 2021-07-12 ASSESSMENT — SOCIAL DETERMINANTS OF HEALTH (SDOH): HOW HARD IS IT FOR YOU TO PAY FOR THE VERY BASICS LIKE FOOD, HOUSING, MEDICAL CARE, AND HEATING?: NOT HARD AT ALL

## 2021-07-12 ASSESSMENT — LIFESTYLE VARIABLES: HOW OFTEN DO YOU HAVE A DRINK CONTAINING ALCOHOL: NEVER

## 2021-07-12 NOTE — PROGRESS NOTES
Subjective:      Patient ID: Branden Becerril is a 60 y.o. male. The patient is asymptomatic today. His memory is normal.  He is current with his influenza, pneumonia vaccine. He gets regular Cologuard testing he does not want colonoscopic exam.  He has been treated with adiel Harrison for his schizophrenia mixed hyperlipidemia and hypertension. He has abnormal gait due to infantile cerebral palsy      Review of Systems   Constitutional: Negative for fatigue and fever. Respiratory: Negative for shortness of breath. Gastrointestinal: Negative for diarrhea. He has soft and more frequent stools but he has no mucus or blood   Neurological: Negative for weakness. Objective:   Physical Exam  Vitals reviewed. Constitutional:       General: He is not in acute distress. Appearance: He is well-developed. He is not diaphoretic. HENT:      Head: Normocephalic and atraumatic. No abrasion, contusion or laceration. Right Ear: External ear normal.      Left Ear: External ear normal.      Nose: No septal deviation, laceration or mucosal edema. Mouth/Throat:      Mouth: Mucous membranes are not pale. Pharynx: No oropharyngeal exudate. Eyes:      General: No scleral icterus. Right eye: No discharge. Left eye: No discharge. Pupils: Pupils are equal, round, and reactive to light. Neck:      Thyroid: No thyroid mass or thyromegaly. Vascular: No carotid bruit or hepatojugular reflux. Cardiovascular:      Rate and Rhythm: Normal rate and regular rhythm. Heart sounds: Normal heart sounds. No murmur heard. Pulmonary:      Effort: Pulmonary effort is normal.      Breath sounds: Normal breath sounds. No wheezing or rales. Abdominal:      General: There is no distension. Palpations: Abdomen is soft. Abdomen is not rigid. There is no mass. Tenderness: There is no abdominal tenderness. There is no rebound. Hernia: No hernia is present.  There is no hernia in the ventral area. Musculoskeletal:         General: No tenderness. Normal range of motion. Cervical back: Neck supple. Right lower leg: Edema present. Left lower leg: Edema present. Lymphadenopathy:      Cervical: No cervical adenopathy. Upper Body:      Right upper body: No supraclavicular or epitrochlear adenopathy. Left upper body: No supraclavicular or epitrochlear adenopathy. Skin:     General: Skin is warm and dry. Coloration: Skin is not pale. Findings: No bruising, erythema, laceration, petechiae or rash. Nails: There is no clubbing. Neurological:      Mental Status: He is alert and oriented to person, place, and time. Cranial Nerves: No cranial nerve deficit. Motor: Tremor, atrophy and abnormal muscle tone (cerebral palsy) present. No seizure activity. Coordination: Coordination abnormal.      Gait: Gait abnormal.      Comments: Has cerebral palsy     Psychiatric:         Speech: Speech normal.         Behavior: Behavior normal. Behavior is cooperative. Thought Content: Thought content normal.         Judgment: Judgment normal.         Assessment / Plan:      Diagnosis Orders   1. Infantile cerebral palsy ([facility]     2. Paranoid schizophrenia ([facility]     3. Essential hypertension, benign = blood pressure is well controlled and his renal function is normal.  Also has normal liver function furosemide (LASIX) 20 MG tablet    levothyroxine (SYNTHROID) 50 MCG tablet    lovastatin (MEVACOR) 20 MG tablet   4. Mixed hyperlipidemia = his LDL in March was 92      Return in about 12 weeks (around 10/4/2021) for Follow Up, hypertension. No orders of the defined types were placed in this encounter.     Orders Placed This Encounter   Medications    furosemide (LASIX) 20 MG tablet     Sig: Take 1 tablet by mouth daily     Dispense:  90 tablet     Refill:  3    levothyroxine (SYNTHROID) 50 MCG tablet     Sig: TAKE 1 TABLET BY MOUTH EVERY DAY Dispense:  90 tablet     Refill:  3    lovastatin (MEVACOR) 20 MG tablet     Sig: TAKE 1 TABLET BY MOUTH EVERY NIGHT     Dispense:  90 tablet     Refill:  3         Return in about 12 weeks (around 10/4/2021) for Follow Up, hypertension. No orders of the defined types were placed in this encounter. Orders Placed This Encounter   Medications    furosemide (LASIX) 20 MG tablet     Sig: Take 1 tablet by mouth daily     Dispense:  90 tablet     Refill:  3    levothyroxine (SYNTHROID) 50 MCG tablet     Sig: TAKE 1 TABLET BY MOUTH EVERY DAY     Dispense:  90 tablet     Refill:  3    lovastatin (MEVACOR) 20 MG tablet     Sig: TAKE 1 TABLET BY MOUTH EVERY NIGHT     Dispense:  90 tablet     Refill:  3     Visit Information    Have you changed or started any medications since your last visit including any over-the-counter medicines, vitamins, or herbal medicines? no   Are you having any side effects from any of your medications? -  no  Have you stopped taking any of your medications? Is so, why? -  no    Have you seen any other physician or provider since your last visit? No  Have you had any other diagnostic tests since your last visit? No  Have you been seen in the emergency room and/or had an admission to a hospital since we last saw you? No  Have you had your routine dental cleaning in the past 6 months? no    Have you activated your [employer] account? If not, what are your barriers?  [facility]    Patient Care Team:  Bubba Coats MD as PCP - General (Internal Medicine)  Bubba Coats MD as PCP - [facility] Provider    Medical History Review  Past Medical, Family, and Social History reviewed and does not contribute to the patient presenting condition    Health Maintenance   Topic Date Due    Hepatitis C screen  Never done    HIV screen  Never done    DTaP/Tdap/Td vaccine (1 - Tdap) Never done    Shingles Vaccine (1 of 2) Never done    Flu vaccine (1) 09/01/2021    Lipid screen  03/02/2022    TSH testing 03/02/2022    Potassium monitoring  03/02/2022    Creatinine monitoring  03/02/2022    Annual Wellness Visit (AWV)  03/03/2022    Colon cancer screen fecal DNA test (Cologuard)  11/24/2023    COVID-19 Vaccine  Completed    Hepatitis A vaccine  Aged Out    Hepatitis B vaccine  Aged Out    Hib vaccine  Aged Out    Meningococcal (ACWY) vaccine  Aged Out    Pneumococcal 0-64 years Vaccine  Aged Out

## 2021-10-12 ENCOUNTER — TELEPHONE (OUTPATIENT)
Dept: INTERNAL MEDICINE CLINIC | Age: 56
End: 2021-10-12

## 2021-10-12 ENCOUNTER — OFFICE VISIT (OUTPATIENT)
Dept: INTERNAL MEDICINE CLINIC | Age: 56
End: 2021-10-12
Payer: MEDICARE

## 2021-10-12 VITALS
BODY MASS INDEX: 31.38 KG/M2 | HEART RATE: 62 BPM | OXYGEN SATURATION: 97 % | DIASTOLIC BLOOD PRESSURE: 80 MMHG | SYSTOLIC BLOOD PRESSURE: 132 MMHG | WEIGHT: 166 LBS

## 2021-10-12 DIAGNOSIS — G80.9 INFANTILE CEREBRAL PALSY (HCC): ICD-10-CM

## 2021-10-12 DIAGNOSIS — F20.0 PARANOID SCHIZOPHRENIA (HCC): ICD-10-CM

## 2021-10-12 DIAGNOSIS — I10 ESSENTIAL HYPERTENSION, BENIGN: ICD-10-CM

## 2021-10-12 DIAGNOSIS — E03.9 ACQUIRED HYPOTHYROIDISM: Primary | ICD-10-CM

## 2021-10-12 DIAGNOSIS — E78.2 MIXED HYPERLIPIDEMIA: ICD-10-CM

## 2021-10-12 PROCEDURE — G8484 FLU IMMUNIZE NO ADMIN: HCPCS | Performed by: INTERNAL MEDICINE

## 2021-10-12 PROCEDURE — G8427 DOCREV CUR MEDS BY ELIG CLIN: HCPCS | Performed by: INTERNAL MEDICINE

## 2021-10-12 PROCEDURE — 1036F TOBACCO NON-USER: CPT | Performed by: INTERNAL MEDICINE

## 2021-10-12 PROCEDURE — G8417 CALC BMI ABV UP PARAM F/U: HCPCS | Performed by: INTERNAL MEDICINE

## 2021-10-12 PROCEDURE — 3017F COLORECTAL CA SCREEN DOC REV: CPT | Performed by: INTERNAL MEDICINE

## 2021-10-12 PROCEDURE — 99214 OFFICE O/P EST MOD 30 MIN: CPT | Performed by: INTERNAL MEDICINE

## 2021-10-12 RX ORDER — FUROSEMIDE 20 MG/1
20 TABLET ORAL DAILY
Qty: 90 TABLET | Refills: 3 | Status: SHIPPED | OUTPATIENT
Start: 2021-10-12 | End: 2022-04-08 | Stop reason: SDUPTHER

## 2021-10-12 RX ORDER — LOVASTATIN 20 MG/1
TABLET ORAL
Qty: 90 TABLET | Refills: 3 | Status: SHIPPED | OUTPATIENT
Start: 2021-10-12 | End: 2022-03-08

## 2021-10-12 RX ORDER — LEVOTHYROXINE SODIUM 0.05 MG/1
TABLET ORAL
Qty: 90 TABLET | Refills: 3 | Status: SHIPPED | OUTPATIENT
Start: 2021-10-12 | End: 2022-04-08 | Stop reason: SDUPTHER

## 2021-10-12 ASSESSMENT — ENCOUNTER SYMPTOMS
SHORTNESS OF BREATH: 0
DIARRHEA: 0

## 2021-10-12 NOTE — TELEPHONE ENCOUNTER
Per Dr. Mirella Ramirez verbal communication, a permanent disability parking placard was issued for pt.

## 2021-10-12 NOTE — LETTER
[facility]GRIT[facility]  [address], [address] [facility] [address]  Phone: [phone]  Fax: [phone]    Rylee Garcia MD         October 12, 2021     Patient: Derek Farmer   YOB: 1965   Date of Visit: 10/12/2021       To Whom It May Concern: It is my medical opinion that [facility] requires a disability parking placard for the following reasons:  He has limited walking ability due to a neurologic condition. Duration of need: permanent    If you have any questions or concerns, please don't hesitate to call.     Sincerely,        Rylee Garcia MD

## 2021-10-12 NOTE — PROGRESS NOTES
Subjective:      Patient ID: Derek Farmer is a 60 y.o. male. The patient is asymptomatic today. His memory is normal.  He is current with his influenza, pneumonia vaccine. He gets regular Cologuard testing he does not want colonoscopic exam.  He has been treated with adiel Orourke for his schizophrenia mixed hyperlipidemia and hypertension. He has abnormal gait due to infantile cerebral palsy  He is asymptomatic today and his blood pressure is well controlled. He has had no rash in his groin area      Review of Systems   Constitutional: Negative for fatigue and fever. Respiratory: Negative for shortness of breath. Gastrointestinal: Negative for diarrhea. He has soft and more frequent stools but he has no mucus or blood   Neurological: Negative for weakness. Objective:   Physical Exam  Vitals reviewed. Constitutional:       General: He is not in acute distress. Appearance: He is well-developed. He is not diaphoretic. HENT:      Head: Normocephalic and atraumatic. No abrasion, contusion or laceration. Right Ear: External ear normal.      Left Ear: External ear normal.      Nose: No septal deviation, laceration or mucosal edema. Mouth/Throat:      Mouth: Mucous membranes are not pale. Pharynx: No oropharyngeal exudate. Eyes:      General: No scleral icterus. Right eye: No discharge. Left eye: No discharge. Pupils: Pupils are equal, round, and reactive to light. Neck:      Thyroid: No thyroid mass or thyromegaly. Vascular: No carotid bruit or hepatojugular reflux. Cardiovascular:      Rate and Rhythm: Normal rate and regular rhythm. Heart sounds: Normal heart sounds. No murmur heard. Pulmonary:      Effort: Pulmonary effort is normal.      Breath sounds: Normal breath sounds. No wheezing or rales. Abdominal:      General: There is no distension. Palpations: Abdomen is soft. Abdomen is not rigid. There is no mass. Tenderness: There is no abdominal tenderness. There is no rebound. Hernia: No hernia is present. There is no hernia in the ventral area. Musculoskeletal:         General: No tenderness. Normal range of motion. Cervical back: Neck supple. Right lower leg: Edema present. Left lower leg: Edema present. Lymphadenopathy:      Cervical: No cervical adenopathy. Upper Body:      Right upper body: No supraclavicular or epitrochlear adenopathy. Left upper body: No supraclavicular or epitrochlear adenopathy. Skin:     General: Skin is warm and dry. Coloration: Skin is not pale. Findings: No bruising, erythema, laceration, petechiae or rash. Nails: There is no clubbing. Neurological:      Mental Status: He is alert and oriented to person, place, and time. Cranial Nerves: No cranial nerve deficit. Motor: Weakness, tremor, atrophy and abnormal muscle tone (cerebral palsy) present. No seizure activity. Coordination: Coordination abnormal.      Gait: Gait abnormal.      Comments: Has cerebral palsy     Psychiatric:         Speech: Speech normal.         Behavior: Behavior normal. Behavior is cooperative. Thought Content: Thought content normal.         Judgment: Judgment normal.         Assessment / Plan:      Diagnosis Orders   1. Acquired hypothyroidism = he is TSH was forward he will be kept on the same dose of thyroxine    2. Essential hypertension, benign = blood pressure well controlled on present therapy furosemide (LASIX) 20 MG tablet    levothyroxine (SYNTHROID) 50 MCG tablet    lovastatin (MEVACOR) 20 MG tablet   3. Paranoid schizophrenia ([facility] [address] = he will be kept on the same antipsychotic medication. There is no evidence of liver dysfunction    4. Mixed hyperlipidemia = LDL was 92    5.  Infantile cerebral palsy (HCC)= continues to have motor weakness in both lower extremities has ataxia    Return in about 6 months (around 4/12/2022) for Follow Up.  No orders of the defined types were placed in this encounter. No orders of the defined types were placed in this encounter. Visit Information    Have you changed or started any medications since your last visit including any over-the-counter medicines, vitamins, or herbal medicines? no   Are you having any side effects from any of your medications? -  no  Have you stopped taking any of your medications? Is so, why? -  no    Have you seen any other physician or provider since your last visit? No  Have you had any other diagnostic tests since your last visit? No  Have you been seen in the emergency room and/or had an admission to a hospital since we last saw you? No  Have you had your routine dental cleaning in the past 6 months? no    Have you activated your [employer] account? If not, what are your barriers?  Yes     Patient Care Team:  Lety Horta MD as PCP - General (Internal Medicine)  Lety Horta MD as PCP - [facility]    Medical History Review  Past Medical, Family, and Social History reviewed and does not contribute to the patient presenting condition    Health Maintenance   Topic Date Due    Hepatitis C screen  Never done    HIV screen  Never done    DTaP/Tdap/Td vaccine (1 - Tdap) Never done    Shingles Vaccine (1 of 2) Never done    Flu vaccine (1) Never done    Lipid screen  03/02/2022    TSH testing  03/02/2022    Potassium monitoring  03/02/2022    Creatinine monitoring  03/02/2022    Annual Wellness Visit (AWV)  03/03/2022    Colon cancer screen fecal DNA test (Cologuard)  11/24/2023    COVID-19 Vaccine  Completed    Hepatitis A vaccine  Aged Out    Hepatitis B vaccine  Aged Out    Hib vaccine  Aged Out    Meningococcal (ACWY) vaccine  Aged Out    Pneumococcal 0-64 years Vaccine  Aged Out

## 2022-03-08 DIAGNOSIS — I10 ESSENTIAL HYPERTENSION, BENIGN: ICD-10-CM

## 2022-03-08 RX ORDER — LOVASTATIN 20 MG/1
TABLET ORAL
Qty: 90 TABLET | Refills: 3 | Status: SHIPPED | OUTPATIENT
Start: 2022-03-08 | End: 2022-05-16 | Stop reason: SDUPTHER

## 2022-04-03 DIAGNOSIS — I10 ESSENTIAL HYPERTENSION, BENIGN: ICD-10-CM

## 2022-04-04 RX ORDER — FUROSEMIDE 20 MG/1
20 TABLET ORAL DAILY
Qty: 90 TABLET | Refills: 3 | OUTPATIENT
Start: 2022-04-04

## 2022-04-06 DIAGNOSIS — I10 ESSENTIAL HYPERTENSION, BENIGN: ICD-10-CM

## 2022-04-06 NOTE — TELEPHONE ENCOUNTER
Patient had appointment scheduled today 4/6, LVM to reschedule but pt never received voicemail, she would like the medications just refilled until annual visit please. Next appointment in office scheduled 5/16 for AMV.

## 2022-04-08 RX ORDER — FUROSEMIDE 20 MG/1
20 TABLET ORAL DAILY
Qty: 90 TABLET | Refills: 3 | Status: SHIPPED | OUTPATIENT
Start: 2022-04-08 | End: 2022-05-16 | Stop reason: SDUPTHER

## 2022-04-08 RX ORDER — LEVOTHYROXINE SODIUM 0.05 MG/1
TABLET ORAL
Qty: 90 TABLET | Refills: 3 | Status: SHIPPED | OUTPATIENT
Start: 2022-04-08 | End: 2022-05-16 | Stop reason: SDUPTHER

## 2022-05-16 ENCOUNTER — HOSPITAL ENCOUNTER (OUTPATIENT)
Age: 57
Setting detail: SPECIMEN
Discharge: HOME | End: 2022-05-16

## 2022-05-16 ENCOUNTER — OFFICE VISIT (OUTPATIENT)
Dept: INTERNAL MEDICINE CLINIC | Age: 57
End: 2022-05-16
Payer: MEDICARE

## 2022-05-16 VITALS
DIASTOLIC BLOOD PRESSURE: 82 MMHG | SYSTOLIC BLOOD PRESSURE: 126 MMHG | BODY MASS INDEX: 32.66 KG/M2 | HEIGHT: 61 IN | WEIGHT: 173 LBS

## 2022-05-16 DIAGNOSIS — F20.0 PARANOID SCHIZOPHRENIA (HCC): ICD-10-CM

## 2022-05-16 DIAGNOSIS — I10 ESSENTIAL HYPERTENSION, BENIGN: ICD-10-CM

## 2022-05-16 DIAGNOSIS — E78.2 MIXED HYPERLIPIDEMIA: ICD-10-CM

## 2022-05-16 DIAGNOSIS — Z13.220 SCREENING FOR HYPERLIPIDEMIA: Primary | ICD-10-CM

## 2022-05-16 DIAGNOSIS — Z12.5 ENCOUNTER FOR SCREENING FOR MALIGNANT NEOPLASM OF PROSTATE: ICD-10-CM

## 2022-05-16 DIAGNOSIS — G80.9 INFANTILE CEREBRAL PALSY (HCC): ICD-10-CM

## 2022-05-16 LAB
ABSOLUTE EOS #: 0.07 K/UL (ref 0–0.44)
ABSOLUTE IMMATURE GRANULOCYTE: <0.03 K/UL (ref 0–0.3)
ABSOLUTE LYMPH #: 0.95 K/UL (ref 1.1–3.7)
ABSOLUTE MONO #: 0.52 K/UL (ref 0.1–1.2)
ALBUMIN SERPL-MCNC: 4.6 G/DL (ref 3.5–5.2)
ALBUMIN/GLOBULIN RATIO: 1.4 (ref 1–2.5)
ALP BLD-CCNC: 73 U/L (ref 40–129)
ALT SERPL-CCNC: 29 U/L (ref 5–41)
ANION GAP SERPL CALCULATED.3IONS-SCNC: 15 MMOL/L (ref 9–17)
AST SERPL-CCNC: 21 U/L
BASOPHILS # BLD: 1 % (ref 0–2)
BASOPHILS ABSOLUTE: 0.04 K/UL (ref 0–0.2)
BILIRUB SERPL-MCNC: 0.44 MG/DL (ref 0.3–1.2)
BUN BLDV-MCNC: 22 MG/DL (ref 6–20)
CALCIUM SERPL-MCNC: 9.4 MG/DL (ref 8.6–10.4)
CHLORIDE BLD-SCNC: 106 MMOL/L (ref 98–107)
CO2: 20 MMOL/L (ref 20–31)
CREAT SERPL-MCNC: 0.91 MG/DL (ref 0.7–1.2)
EOSINOPHILS RELATIVE PERCENT: 1 % (ref 1–4)
GFR AFRICAN AMERICAN: >60 ML/MIN
GFR NON-AFRICAN AMERICAN: >60 ML/MIN
GFR SERPL CREATININE-BSD FRML MDRD: ABNORMAL ML/MIN/{1.73_M2}
GLUCOSE BLD-MCNC: 98 MG/DL (ref 70–99)
HCT VFR BLD CALC: 43.5 % (ref 40.7–50.3)
HEMOGLOBIN: 14.3 G/DL (ref 13–17)
IMMATURE GRANULOCYTES: 0 %
LYMPHOCYTES # BLD: 16 % (ref 24–43)
MCH RBC QN AUTO: 29.7 PG (ref 25.2–33.5)
MCHC RBC AUTO-ENTMCNC: 32.9 G/DL (ref 28.4–34.8)
MCV RBC AUTO: 90.4 FL (ref 82.6–102.9)
MONOCYTES # BLD: 9 % (ref 3–12)
NRBC AUTOMATED: 0 PER 100 WBC
PDW BLD-RTO: 12.9 % (ref 11.8–14.4)
PLATELET # BLD: 220 K/UL (ref 138–453)
PMV BLD AUTO: 9.6 FL (ref 8.1–13.5)
POTASSIUM SERPL-SCNC: 4.2 MMOL/L (ref 3.7–5.3)
PROSTATE SPECIFIC ANTIGEN: 0.49 NG/ML
RBC # BLD: 4.81 M/UL (ref 4.21–5.77)
SEDIMENTATION RATE, ERYTHROCYTE: 29 MM/HR (ref 0–20)
SEG NEUTROPHILS: 73 % (ref 36–65)
SEGMENTED NEUTROPHILS ABSOLUTE COUNT: 4.27 K/UL (ref 1.5–8.1)
SODIUM BLD-SCNC: 141 MMOL/L (ref 135–144)
TOTAL PROTEIN: 7.9 G/DL (ref 6.4–8.3)
TSH SERPL DL<=0.05 MIU/L-ACNC: 5.39 UIU/ML (ref 0.3–5)
WBC # BLD: 5.9 K/UL (ref 3.5–11.3)

## 2022-05-16 PROCEDURE — 99214 OFFICE O/P EST MOD 30 MIN: CPT | Performed by: INTERNAL MEDICINE

## 2022-05-16 PROCEDURE — G8427 DOCREV CUR MEDS BY ELIG CLIN: HCPCS | Performed by: INTERNAL MEDICINE

## 2022-05-16 PROCEDURE — G8417 CALC BMI ABV UP PARAM F/U: HCPCS | Performed by: INTERNAL MEDICINE

## 2022-05-16 PROCEDURE — 3017F COLORECTAL CA SCREEN DOC REV: CPT | Performed by: INTERNAL MEDICINE

## 2022-05-16 PROCEDURE — 1036F TOBACCO NON-USER: CPT | Performed by: INTERNAL MEDICINE

## 2022-05-16 RX ORDER — FUROSEMIDE 20 MG/1
20 TABLET ORAL DAILY
Qty: 90 TABLET | Refills: 3 | Status: SHIPPED | OUTPATIENT
Start: 2022-05-16

## 2022-05-16 RX ORDER — LEVOTHYROXINE SODIUM 0.05 MG/1
TABLET ORAL
Qty: 90 TABLET | Refills: 3 | Status: SHIPPED | OUTPATIENT
Start: 2022-05-16

## 2022-05-16 RX ORDER — LOVASTATIN 20 MG/1
20 TABLET ORAL NIGHTLY
Qty: 90 TABLET | Refills: 3 | Status: SHIPPED | OUTPATIENT
Start: 2022-05-16

## 2022-05-16 ASSESSMENT — PATIENT HEALTH QUESTIONNAIRE - PHQ9
SUM OF ALL RESPONSES TO PHQ QUESTIONS 1-9: 0
1. LITTLE INTEREST OR PLEASURE IN DOING THINGS: 0
SUM OF ALL RESPONSES TO PHQ QUESTIONS 1-9: 0
SUM OF ALL RESPONSES TO PHQ QUESTIONS 1-9: 0
2. FEELING DOWN, DEPRESSED OR HOPELESS: 0
SUM OF ALL RESPONSES TO PHQ9 QUESTIONS 1 & 2: 0
SUM OF ALL RESPONSES TO PHQ QUESTIONS 1-9: 0

## 2022-05-16 ASSESSMENT — ENCOUNTER SYMPTOMS
DIARRHEA: 0
SHORTNESS OF BREATH: 0

## 2022-05-16 ASSESSMENT — LIFESTYLE VARIABLES: HOW OFTEN DO YOU HAVE A DRINK CONTAINING ALCOHOL: NEVER

## 2022-05-16 NOTE — PROGRESS NOTES
Subjective:      Patient ID: Meron Intreiano is a 60 y.o. male. The patient is asymptomatic today. His memory is normal.  He is current with his influenza, pneumonia vaccine. He gets regular Cologuard testing he does not want colonoscopic exam.  He has been treated with antipsychotic medication for his schizophrenia mixed hyperlipidemia and hypertension. He has abnormal gait due to infantile cerebral palsy  He is asymptomatic today and his blood pressure is well controlled. He has had no rash in his groin area  Today on May 16th 2022, he is asymptomatic. He is being treated for hypertension and hyperlipidemia and that is the reason for his visit today. It is to be noted that he is known to have schizophrenia and requires laboratory work-up to monitor for drug side effects from antipsychotic medication      Review of Systems   Constitutional: Negative for fatigue and fever. Respiratory: Negative for shortness of breath. Gastrointestinal: Negative for diarrhea. He has soft and more frequent stools but he has no mucus or blood   Neurological: Negative for weakness. Objective:   Physical Exam  Vitals reviewed. Constitutional:       General: He is not in acute distress. Appearance: He is well-developed. He is not diaphoretic. HENT:      Head: Normocephalic and atraumatic. No abrasion, contusion or laceration. Right Ear: External ear normal.      Left Ear: External ear normal.      Nose: No septal deviation, laceration or mucosal edema. Mouth/Throat:      Mouth: Mucous membranes are not pale. Pharynx: No oropharyngeal exudate. Eyes:      General: No scleral icterus. Right eye: No discharge. Left eye: No discharge. Pupils: Pupils are equal, round, and reactive to light. Neck:      Thyroid: No thyroid mass or thyromegaly. Vascular: No carotid bruit or hepatojugular reflux. Cardiovascular:      Rate and Rhythm: Normal rate and regular rhythm. Heart sounds: Normal heart sounds. No murmur heard. Pulmonary:      Effort: Pulmonary effort is normal.      Breath sounds: Normal breath sounds. No wheezing or rales. Chest:   Breasts:      Right: No supraclavicular adenopathy. Left: No supraclavicular adenopathy. Abdominal:      General: There is no distension. Palpations: Abdomen is soft. Abdomen is not rigid. There is no mass. Tenderness: There is no abdominal tenderness. There is no rebound. Hernia: No hernia is present. There is no hernia in the ventral area. Musculoskeletal:         General: No tenderness. Normal range of motion. Cervical back: Neck supple. Right lower leg: Edema present. Left lower leg: Edema present. Lymphadenopathy:      Cervical: No cervical adenopathy. Upper Body:      Right upper body: No supraclavicular or epitrochlear adenopathy. Left upper body: No supraclavicular or epitrochlear adenopathy. Skin:     General: Skin is warm and dry. Coloration: Skin is not pale. Findings: No bruising, erythema, laceration, petechiae or rash. Nails: There is no clubbing. Neurological:      Mental Status: He is alert and oriented to person, place, and time. Cranial Nerves: No cranial nerve deficit. Motor: Weakness, tremor, atrophy and abnormal muscle tone (cerebral palsy) present. No seizure activity. Coordination: Coordination abnormal.      Gait: Gait abnormal.      Comments: Has cerebral palsy     Psychiatric:         Speech: Speech normal.         Behavior: Behavior normal. Behavior is cooperative. Thought Content: Thought content normal.         Judgment: Judgment normal.         Assessment / Plan:      Diagnosis Orders   1. Screening for hyperlipidemia     2. Essential hypertension, benign     3. Paranoid schizophrenia ([address]     4. Infantile cerebral palsy ([facility] [address]     5.  Mixed hyperlipidemia   Today's main visit was for follow-up of hypertension and mixed hyperlipidemia. His blood pressure is well controlled and he is due for repeat lipid profile and other laboratory work-up. He is on antipsychotic medication and needs repeat liver function and kidney function    Return in about 6 months (around 11/16/2022) for Follow Up, hypertension, hyperlipidemia. No orders of the defined types were placed in this encounter. No orders of the defined types were placed in this encounter. Visit Information    Have you changed or started any medications since your last visit including any over-the-counter medicines, vitamins, or herbal medicines? no   Are you having any side effects from any of your medications? -  no  Have you stopped taking any of your medications? Is so, why? -  no    Have you seen any other physician or provider since your last visit? Yes - Records Obtained  Have you had any other diagnostic tests since your last visit? No  Have you been seen in the emergency room and/or had an admission to a hospital since we last saw you? No  Have you had your routine dental cleaning in the past 6 months? no    Have you activated your [employer] account? If not, what are your barriers?  Yes     Patient Care Team:  Kasey Dixon MD as PCP - General (Internal Medicine)  Kasey Dixon MD as PCP - [facility] Provider    Medical History Review  Past Medical, Family, and Social History reviewed and does not contribute to the patient presenting condition    Health Maintenance   Topic Date Due    HIV screen  Never done    Hepatitis C screen  Never done    DTaP/Tdap/Td vaccine (1 - Tdap) Never done    Shingles vaccine (1 of 2) Never done    COVID-19 Vaccine (3 - Booster for [employer] series) 10/07/2021    Lipids  03/02/2022    Depression Screen  03/02/2022    Annual Wellness Visit (AWV)  03/03/2022    Flu vaccine (Season Ended) 09/01/2022    Colorectal Cancer Screen  11/24/2023    Hepatitis A vaccine  Aged Out    Hepatitis B vaccine  Aged Out  Hib vaccine  Aged Out    Meningococcal (ACWY) vaccine  Aged Out    Pneumococcal 0-64 years Vaccine  Aged Out

## 2022-05-20 ENCOUNTER — HOSPITAL ENCOUNTER (OUTPATIENT)
Age: 57
Setting detail: SPECIMEN
Discharge: HOME | End: 2022-05-20

## 2022-05-20 DIAGNOSIS — I10 ESSENTIAL HYPERTENSION, BENIGN: ICD-10-CM

## 2022-05-20 LAB
-: ABNORMAL
BILIRUBIN URINE: NEGATIVE
CASTS UA: ABNORMAL /LPF (ref 0–8)
COLOR: YELLOW
EPITHELIAL CELLS UA: ABNORMAL /HPF (ref 0–5)
GLUCOSE URINE: NEGATIVE
KETONES, URINE: NEGATIVE
LEUKOCYTE ESTERASE, URINE: NEGATIVE
NITRITE, URINE: NEGATIVE
PH UA: 6 (ref 5–8)
PROTEIN UA: ABNORMAL
RBC UA: ABNORMAL /HPF (ref 0–4)
SPECIFIC GRAVITY UA: 1.02 (ref 1–1.03)
TURBIDITY: CLEAR
URINE HGB: NEGATIVE
UROBILINOGEN, URINE: NORMAL
WBC UA: ABNORMAL /HPF (ref 0–5)

## 2022-11-16 ENCOUNTER — HOSPITAL ENCOUNTER (OUTPATIENT)
Age: 57
Setting detail: SPECIMEN
Discharge: HOME | End: 2022-11-16

## 2022-11-16 ENCOUNTER — OFFICE VISIT (OUTPATIENT)
Dept: INTERNAL MEDICINE CLINIC | Age: 57
End: 2022-11-16
Payer: MEDICARE

## 2022-11-16 VITALS
DIASTOLIC BLOOD PRESSURE: 74 MMHG | WEIGHT: 178 LBS | BODY MASS INDEX: 33.61 KG/M2 | HEIGHT: 61 IN | SYSTOLIC BLOOD PRESSURE: 128 MMHG

## 2022-11-16 DIAGNOSIS — Z72.89 OTHER PROBLEMS RELATED TO LIFESTYLE: ICD-10-CM

## 2022-11-16 DIAGNOSIS — Z00.00 MEDICARE ANNUAL WELLNESS VISIT, SUBSEQUENT: Primary | ICD-10-CM

## 2022-11-16 DIAGNOSIS — I10 PRIMARY HYPERTENSION: ICD-10-CM

## 2022-11-16 DIAGNOSIS — G80.9 INFANTILE CEREBRAL PALSY (HCC): ICD-10-CM

## 2022-11-16 DIAGNOSIS — Z00.00 MEDICARE ANNUAL WELLNESS VISIT, SUBSEQUENT: ICD-10-CM

## 2022-11-16 DIAGNOSIS — I10 ESSENTIAL HYPERTENSION, BENIGN: ICD-10-CM

## 2022-11-16 DIAGNOSIS — E03.9 ACQUIRED HYPOTHYROIDISM: ICD-10-CM

## 2022-11-16 DIAGNOSIS — Z71.89 ACP (ADVANCE CARE PLANNING): ICD-10-CM

## 2022-11-16 DIAGNOSIS — Z11.59 NEED FOR HEPATITIS C SCREENING TEST: ICD-10-CM

## 2022-11-16 DIAGNOSIS — F20.0 PARANOID SCHIZOPHRENIA (HCC): ICD-10-CM

## 2022-11-16 DIAGNOSIS — Z23 NEEDS FLU SHOT: ICD-10-CM

## 2022-11-16 DIAGNOSIS — E78.2 MIXED HYPERLIPIDEMIA: ICD-10-CM

## 2022-11-16 LAB
ABSOLUTE EOS #: 0.13 K/UL (ref 0–0.44)
ABSOLUTE IMMATURE GRANULOCYTE: <0.03 K/UL (ref 0–0.3)
ABSOLUTE LYMPH #: 0.84 K/UL (ref 1.1–3.7)
ABSOLUTE MONO #: 0.49 K/UL (ref 0.1–1.2)
ALBUMIN SERPL-MCNC: 4.8 G/DL (ref 3.5–5.2)
ALBUMIN/GLOBULIN RATIO: 1.5 (ref 1–2.5)
ALP BLD-CCNC: 84 U/L (ref 40–129)
ALT SERPL-CCNC: 36 U/L (ref 5–41)
ANION GAP SERPL CALCULATED.3IONS-SCNC: 18 MMOL/L (ref 9–17)
AST SERPL-CCNC: 26 U/L
BASOPHILS # BLD: 1 % (ref 0–2)
BASOPHILS ABSOLUTE: 0.04 K/UL (ref 0–0.2)
BILIRUB SERPL-MCNC: 0.4 MG/DL (ref 0.3–1.2)
BILIRUBIN URINE: NEGATIVE
BUN BLDV-MCNC: 17 MG/DL (ref 6–20)
CALCIUM SERPL-MCNC: 9.7 MG/DL (ref 8.6–10.4)
CHLORIDE BLD-SCNC: 106 MMOL/L (ref 98–107)
CHOLESTEROL, FASTING: 182 MG/DL
CHOLESTEROL/HDL RATIO: 4.8
CO2: 20 MMOL/L (ref 20–31)
COLOR: YELLOW
CREAT SERPL-MCNC: 1.02 MG/DL (ref 0.7–1.2)
EOSINOPHILS RELATIVE PERCENT: 2 % (ref 1–4)
EPITHELIAL CELLS UA: ABNORMAL /HPF (ref 0–5)
GFR SERPL CREATININE-BSD FRML MDRD: >60 ML/MIN/1.73M2
GLUCOSE BLD-MCNC: 116 MG/DL (ref 70–99)
GLUCOSE URINE: NEGATIVE
HCT VFR BLD CALC: 42.3 % (ref 40.7–50.3)
HDLC SERPL-MCNC: 38 MG/DL
HEMOGLOBIN: 14 G/DL (ref 13–17)
HEPATITIS C ANTIBODY: NONREACTIVE
IMMATURE GRANULOCYTES: 0 %
KETONES, URINE: NEGATIVE
LDL CHOLESTEROL: 85 MG/DL (ref 0–130)
LEUKOCYTE ESTERASE, URINE: NEGATIVE
LYMPHOCYTES # BLD: 15 % (ref 24–43)
MCH RBC QN AUTO: 30 PG (ref 25.2–33.5)
MCHC RBC AUTO-ENTMCNC: 33.1 G/DL (ref 28.4–34.8)
MCV RBC AUTO: 90.8 FL (ref 82.6–102.9)
MONOCYTES # BLD: 9 % (ref 3–12)
NITRITE, URINE: NEGATIVE
NRBC AUTOMATED: 0 PER 100 WBC
PDW BLD-RTO: 13.4 % (ref 11.8–14.4)
PH UA: 5 (ref 5–8)
PLATELET # BLD: 208 K/UL (ref 138–453)
PMV BLD AUTO: 10 FL (ref 8.1–13.5)
POTASSIUM SERPL-SCNC: 4.7 MMOL/L (ref 3.7–5.3)
PROTEIN UA: ABNORMAL
RBC # BLD: 4.66 M/UL (ref 4.21–5.77)
RBC UA: ABNORMAL /HPF (ref 0–4)
SEDIMENTATION RATE, ERYTHROCYTE: 18 MM/HR (ref 0–20)
SEG NEUTROPHILS: 73 % (ref 36–65)
SEGMENTED NEUTROPHILS ABSOLUTE COUNT: 3.98 K/UL (ref 1.5–8.1)
SODIUM BLD-SCNC: 144 MMOL/L (ref 135–144)
SPECIFIC GRAVITY UA: 1.02 (ref 1–1.03)
TOTAL PROTEIN: 8 G/DL (ref 6.4–8.3)
TRIGLYCERIDE, FASTING: 295 MG/DL
TSH SERPL DL<=0.05 MIU/L-ACNC: 5.34 UIU/ML (ref 0.3–5)
TURBIDITY: CLEAR
URINE HGB: NEGATIVE
UROBILINOGEN, URINE: NORMAL
WBC # BLD: 5.5 K/UL (ref 3.5–11.3)
WBC UA: ABNORMAL /HPF (ref 0–5)

## 2022-11-16 PROCEDURE — 3074F SYST BP LT 130 MM HG: CPT | Performed by: INTERNAL MEDICINE

## 2022-11-16 PROCEDURE — 90674 CCIIV4 VAC NO PRSV 0.5 ML IM: CPT | Performed by: INTERNAL MEDICINE

## 2022-11-16 PROCEDURE — G8427 DOCREV CUR MEDS BY ELIG CLIN: HCPCS | Performed by: INTERNAL MEDICINE

## 2022-11-16 PROCEDURE — 99214 OFFICE O/P EST MOD 30 MIN: CPT | Performed by: INTERNAL MEDICINE

## 2022-11-16 PROCEDURE — 3078F DIAST BP <80 MM HG: CPT | Performed by: INTERNAL MEDICINE

## 2022-11-16 PROCEDURE — 3017F COLORECTAL CA SCREEN DOC REV: CPT | Performed by: INTERNAL MEDICINE

## 2022-11-16 PROCEDURE — G8484 FLU IMMUNIZE NO ADMIN: HCPCS | Performed by: INTERNAL MEDICINE

## 2022-11-16 PROCEDURE — 99497 ADVNCD CARE PLAN 30 MIN: CPT | Performed by: INTERNAL MEDICINE

## 2022-11-16 PROCEDURE — G0008 ADMIN INFLUENZA VIRUS VAC: HCPCS | Performed by: INTERNAL MEDICINE

## 2022-11-16 PROCEDURE — G0439 PPPS, SUBSEQ VISIT: HCPCS | Performed by: INTERNAL MEDICINE

## 2022-11-16 PROCEDURE — 1036F TOBACCO NON-USER: CPT | Performed by: INTERNAL MEDICINE

## 2022-11-16 PROCEDURE — G8417 CALC BMI ABV UP PARAM F/U: HCPCS | Performed by: INTERNAL MEDICINE

## 2022-11-16 RX ORDER — LOVASTATIN 20 MG/1
20 TABLET ORAL NIGHTLY
Qty: 90 TABLET | Refills: 3 | Status: SHIPPED | OUTPATIENT
Start: 2022-11-16

## 2022-11-16 RX ORDER — FUROSEMIDE 20 MG/1
20 TABLET ORAL DAILY
Qty: 90 TABLET | Refills: 3 | Status: SHIPPED | OUTPATIENT
Start: 2022-11-16

## 2022-11-16 RX ORDER — LEVOTHYROXINE SODIUM 0.05 MG/1
TABLET ORAL
Qty: 90 TABLET | Refills: 3 | Status: SHIPPED | OUTPATIENT
Start: 2022-11-16

## 2022-11-16 SDOH — ECONOMIC STABILITY: FOOD INSECURITY: WITHIN THE PAST 12 MONTHS, YOU WORRIED THAT YOUR FOOD WOULD RUN OUT BEFORE YOU GOT MONEY TO BUY MORE.: NEVER TRUE

## 2022-11-16 SDOH — ECONOMIC STABILITY: FOOD INSECURITY: WITHIN THE PAST 12 MONTHS, THE FOOD YOU BOUGHT JUST DIDN'T LAST AND YOU DIDN'T HAVE MONEY TO GET MORE.: NEVER TRUE

## 2022-11-16 ASSESSMENT — ENCOUNTER SYMPTOMS
DIARRHEA: 0
SHORTNESS OF BREATH: 0

## 2022-11-16 ASSESSMENT — PATIENT HEALTH QUESTIONNAIRE - PHQ9
SUM OF ALL RESPONSES TO PHQ QUESTIONS 1-9: 0
1. LITTLE INTEREST OR PLEASURE IN DOING THINGS: 0
SUM OF ALL RESPONSES TO PHQ9 QUESTIONS 1 & 2: 0
2. FEELING DOWN, DEPRESSED OR HOPELESS: 0

## 2022-11-16 ASSESSMENT — SOCIAL DETERMINANTS OF HEALTH (SDOH): HOW HARD IS IT FOR YOU TO PAY FOR THE VERY BASICS LIKE FOOD, HOUSING, MEDICAL CARE, AND HEATING?: NOT HARD AT ALL

## 2022-11-16 ASSESSMENT — LIFESTYLE VARIABLES
HOW MANY STANDARD DRINKS CONTAINING ALCOHOL DO YOU HAVE ON A TYPICAL DAY: PATIENT DOES NOT DRINK
HOW OFTEN DO YOU HAVE A DRINK CONTAINING ALCOHOL: NEVER

## 2022-11-16 NOTE — PROGRESS NOTES
Subjective:      Patient ID: Krish Kennedy is a 60 y.o. male. HPI    Review of Systems    Objective:   Physical Exam    Assessment / Plan:      Visit Information    Have you changed or started any medications since your last visit including any over-the-counter medicines, vitamins, or herbal medicines? no   Are you having any side effects from any of your medications? -  no  Have you stopped taking any of your medications? Is so, why? -  no    Have you seen any other physician or provider since your last visit? No  Have you had any other diagnostic tests since your last visit? No  Have you been seen in the emergency room and/or had an admission to a hospital since we last saw you? No  Have you had your routine dental cleaning in the past 6 months? no    Have you activated your [email or web address] account? If not, what are your barriers?  Yes     Patient Care Team:  Naa Us MD as PCP - General (Internal Medicine)  Naa Us MD [facility] PCP [facility]    Medical History Review  Past Medical, Family, and Social History reviewed and does not contribute to the patient presenting condition    Health Maintenance   Topic Date Due    HIV screen  Never done    Hepatitis C screen  Never done    DTaP/Tdap/Td vaccine (1 - Tdap) Never done    Shingles vaccine (1 of 2) Never done    COVID-19 Vaccine (3 - Booster for [employer] series) 07/02/2021    Lipids  03/02/2022    Annual Wellness Visit (AWV)  03/03/2022    Flu vaccine (1) Never done    Depression Screen  05/16/2023    Colorectal Cancer Screen  11/24/2023    Hepatitis A vaccine  Aged Out    Hib vaccine  Aged Out    Meningococcal (ACWY) vaccine  Aged Out    Pneumococcal 0-64 years Vaccine  Aged Out

## 2022-11-16 NOTE — PATIENT INSTRUCTIONS
Learning About Cutting Calories  How do calories affect your weight? Food gives your body energy. Energy from the food you eat is measured in calories. This energy keeps your heart beating, your brain active, and your muscles working. Your body needs a certain number of calories each day. After your body uses the calories it needs, it stores extra calories as fat. To lose weight safely, you have to eat fewer calories while eating in a healthy way. How many calories do you need each day? The more active you are, the more calories you need. When you are less active, you need fewer calories. How many calories you need each day also depends on several things, including your age and whether you are male or female. Here are some general guidelines for adults:  Less active women and older adults need 1,600 to 2,000 calories each day. Active women and less active men need 2,000 to 2,400 calories each day. Active men need 2,400 to 3,000 calories each day. How can you cut calories and eat healthy meals? Whole grains, vegetables and fruits, and dried beans are good lower-calorie foods. They give you lots of nutrients and fiber. And they fill you up. Sweets, energy drinks, and soda pop are high in calories. They give you few nutrients and no fiber. Try to limit soda pop, fruit juice, and energy drinks. Drink water instead. Some fats can be part of a healthy diet. But cutting back on fats from highly processed foods like fast foods and many snack foods is a good way to lower the calories in your diet. Also, use smaller amounts of fats like butter, margarine, salad dressing, and mayonnaise. Add fresh garlic, lemon, or herbs to your meals to add flavor without adding fat. Meats and dairy products can be a big source of hidden fats. Try to choose lean or low-fat versions of these products. Fat-free cookies, candies, chips, and frozen treats can still be high in sugar and calories.  Some fat-free foods have more calories than regular ones. Eat fat-free treats in moderation, as you would other foods. If your favorite foods are high in fat, salt, sugar, or calories, limit how often you eat them. Eat smaller servings, or look for healthy substitutes. Fill up on fruits, vegetables, and whole grains. Eating at home  Use meat as a side dish instead of as the main part of your meal.  Try main dishes that use whole wheat pasta, brown rice, dried beans, or vegetables. Find ways to cook with little or no fat, such as broiling, steaming, or grilling. Use cooking spray instead of oil. If you use oil, use a monounsaturated oil, such as canola or olive oil. Trim fat from meats before you cook them. Drain off fat after you brown the meat or while you roast it. Chill soups and stews after you cook them. Then skim the fat off the top after it hardens. Eating out  Order foods that are broiled or poached rather than fried or breaded. Cut back on the amount of butter or margarine that you use on bread. Order sauces, gravies, and salad dressings on the side, and use only a little. When you order pasta, choose tomato sauce rather than cream sauce. Ask for salsa with your baked potato instead of sour cream, butter, cheese, or fuller. Order meals in a small size instead of upgrading to a large. Share an entree, or take part of your food home to eat as another meal.  Share appetizers and desserts. Where can you learn more? Go to https://OrderMyGearkrisAtilekt.Zaya. org and sign in to your Meuugame account. Enter L787 in the [facility] box to learn more about \"Learning About Cutting Calories. \"     If you do not have an account, please click on the \"Sign Up Now\" link. Current as of: May 9, 2022               Content Version: 13.4  © 9380-3915 Healthwise, Incorporated. Care instructions adapted under license by [facility] ([facility]).  If you have questions about a medical condition or this instruction, always ask your healthcare sally. [facility] any warranty or liability for your use of this information. Personalized Preventive Plan for Harjit Lancaster - 11/16/2022  Medicare offers a range of preventive health benefits. Some of the tests and screenings are paid in full while other may be subject to a deductible, co-insurance, and/or copay. Some of these benefits include a comprehensive review of your medical history including lifestyle, illnesses that may run in your family, and various assessments and screenings as appropriate. After reviewing your medical record and screening and assessments performed today your provider may have ordered immunizations, labs, imaging, and/or referrals for you. A list of these orders (if applicable) as well as your Preventive Care list are included within your After Visit Summary for your review. Other Preventive Recommendations:    A preventive eye exam performed by an eye specialist is recommended every 1-2 years to screen for glaucoma; cataracts, macular degeneration, and other eye disorders. A preventive dental visit is recommended every 6 months. Try to get at least 150 minutes of exercise per week or 10,000 steps per day on a pedometer . Order or download the FREE \"Exercise & Physical Activity: Your Everyday Guide\" from [employer]. Call 5-422.936.6704 or search [employer] online. You need 5889-8497 mg of calcium and 8823-6519 IU of vitamin D per day. It is possible to meet your calcium requirement with diet alone, but a vitamin D supplement is usually necessary to meet this goal.  When exposed to the sun, use a sunscreen that protects against both UVA and UVB radiation with an SPF of 30 or greater. Reapply every 2 to 3 hours or after sweating, drying off with a towel, or swimming. Always wear a seat belt when traveling in a car. Always wear a helmet when riding a bicycle or motorcycle.        Learning About Medical Power of [employer]  What is a medical power of ? A medical power of , also called a durable power of  for health care, is one type of the legal forms called advance directives. It lets you name the person you want to make treatment decisions for you if you can't speak or decide for yourself. The person you choose is called your health care agent. This person is also called a health care proxy or health care surrogate. A medical power of  may be called something else in your state. How do you choose a health care agent? Choose your health care agent carefully. This person may or may not be a family member. Talk to the person before you make your final decision. Make sure he or she is comfortable with this responsibility. It's a good idea to choose someone who:  Is at least 25years old. Knows you well and understands what makes life meaningful for you. Understands your [employer] and moral values. Will do what you want, not what he or she wants. Will be able to make difficult choices at a stressful time. Will be able to refuse or stop treatment, if that is what you would want, even if you could die. Will be firm and confident with health professionals if needed. Will ask questions to get needed information. Lives near you or agrees to travel to you if needed. Your family may help you make medical decisions while you can still be part of that process. But it's important to choose one person to be your health care agent in case you aren't able to make decisions for yourself. If you don't fill out the legal form and name a health care agent, the decisions your family can make may be limited. A health care agent may be called something else in your state. Who will make decisions for you if you don't have a health care agent? If you don't have a health care agent or a living will, you may not get the care you want.  Decisions may be made by family members who disagree about your medical care. Or decisions may be made by a medical professional who doesn't know you well. In some cases, a  makes the decisions. When you name a health care agent, it is very clear who has the power to make health decisions for you. How do you name a health care agent? You name your health care agent on a legal form. This form is usually called a medical power of . Ask your hospital, state bar association, or office on aging where to find these forms. You must sign the form to make it legal. Some states require you to get the form notarized. This means that a person called a  watches you sign the form and then he or she signs the form. Some states also require that two or more witnesses sign the form. Be sure to tell your family members and doctors who your health care agent is. Where can you learn more? Go to https://Zenda Technologiespepiceweb.HandsFree Networks. org and sign in to your Ratio account. Enter [id number] in the [facility] box to learn more about \"Learning About [address]. \"     If you do not have an account, please click on the \"Sign Up Now\" link. Current as of: October 6, 2021               Content Version: 13.4  © 2006-2022 Healthwise, Incorporated. Care instructions adapted under license by [facility] ([facility]). If you have questions about a medical condition or this instruction, always ask your healthcare professional. [place] [address] any warranty or liability for your use of this information. Learning About Cutting Calories  How do calories affect your weight? Food gives your body energy. Energy from the food you eat is measured in calories. This energy keeps your heart beating, your brain active, and your muscles working. Your body needs a certain number of calories each day. After your body uses the calories it needs, it stores extra calories as fat.   To lose weight safely, you have to eat fewer calories while eating in a healthy way. How many calories do you need each day? The more active you are, the more calories you need. When you are less active, you need fewer calories. How many calories you need each day also depends on several things, including your age and whether you are male or female. Here are some general guidelines for adults:  Less active women and older adults need 1,600 to 2,000 calories each day. Active women and less active men need 2,000 to 2,400 calories each day. Active men need 2,400 to 3,000 calories each day. How can you cut calories and eat healthy meals? Whole grains, vegetables and fruits, and dried beans are good lower-calorie foods. They give you lots of nutrients and fiber. And they fill you up. Sweets, energy drinks, and soda pop are high in calories. They give you few nutrients and no fiber. Try to limit soda pop, fruit juice, and energy drinks. Drink water instead. Some fats can be part of a healthy diet. But cutting back on fats from highly processed foods like fast foods and many snack foods is a good way to lower the calories in your diet. Also, use smaller amounts of fats like butter, margarine, salad dressing, and mayonnaise. Add fresh garlic, lemon, or herbs to your meals to add flavor without adding fat. Meats and dairy products can be a big source of hidden fats. Try to choose lean or low-fat versions of these products. Fat-free cookies, candies, chips, and frozen treats can still be high in sugar and calories. Some fat-free foods have more calories than regular ones. Eat fat-free treats in moderation, as you would other foods. If your favorite foods are high in fat, salt, sugar, or calories, limit how often you eat them. Eat smaller servings, or look for healthy substitutes. Fill up on fruits, vegetables, and whole grains.   Eating at home  Use meat as a side dish instead of as the main part of your meal.  Try main dishes that use whole wheat pasta, brown rice, dried beans, or vegetables. Find ways to cook with little or no fat, such as broiling, steaming, or grilling. Use cooking spray instead of oil. If you use oil, use a monounsaturated oil, such as canola or olive oil. Trim fat from meats before you cook them. Drain off fat after you brown the meat or while you roast it. Chill soups and stews after you cook them. Then skim the fat off the top after it hardens. Eating out  Order foods that are broiled or poached rather than fried or breaded. Cut back on the amount of butter or margarine that you use on bread. Order sauces, gravies, and salad dressings on the side, and use only a little. When you order pasta, choose tomato sauce rather than cream sauce. Ask for salsa with your baked potato instead of sour cream, butter, cheese, or fuller. Order meals in a small size instead of upgrading to a large. Share an entree, or take part of your food home to eat as another meal.  Share appetizers and desserts. Where can you learn more? Go to https://CrosswisepeVita Sound.Kiddify. org and sign in to your Ardian account. Enter V404 in the [facility] box to learn more about \"Learning About Cutting Calories. \"     If you do not have an account, please click on the \"Sign Up Now\" link. Current as of: May 9, 2022               Content Version: 13.4  © 6212-3791 Healthwise, [facility]. Care instructions adapted under license by [facility] ([facility]). If you have questions about a medical condition or this instruction, always ask your healthcare professional. [place] [address] any warranty or liability for your use of this information. Personalized Preventive Plan for Santana Norris - 11/16/2022  Medicare offers a range of preventive health benefits. Some of the tests and screenings are paid in full while other may be subject to a deductible, co-insurance, and/or copay.     Some of these benefits include a comprehensive review of your medical history including lifestyle, illnesses that may run in your family, and various assessments and screenings as appropriate. After reviewing your medical record and screening and assessments performed today your provider may have ordered immunizations, labs, imaging, and/or referrals for you. A list of these orders (if applicable) as well as your Preventive Care list are included within your After Visit Summary for your review. Other Preventive Recommendations:    A preventive eye exam performed by an eye specialist is recommended every 1-2 years to screen for glaucoma; cataracts, macular degeneration, and other eye disorders. A preventive dental visit is recommended every 6 months. Try to get at least 150 minutes of exercise per week or 10,000 steps per day on a pedometer . Order or download the FREE \"Exercise & Physical Activity: Your Everyday Guide\" from [employer]. Call 8-223.576.7511 or search [employer] online. You need 8540-8041 mg of calcium and 4551-0830 IU of vitamin D per day. It is possible to meet your calcium requirement with diet alone, but a vitamin D supplement is usually necessary to meet this goal.  When exposed to the sun, use a sunscreen that protects against both UVA and UVB radiation with an SPF of 30 or greater. Reapply every 2 to 3 hours or after sweating, drying off with a towel, or swimming. Always wear a seat belt when traveling in a car. Always wear a helmet when riding a bicycle or motorcycle.

## 2022-11-16 NOTE — PROGRESS NOTES
Medicare Annual Wellness Visit    Stefan Crowley is here for Medicare AWV    Assessment & Plan   Medicare annual wellness visit, subsequent  -     Hepatitis C Antibody; Future  Essential hypertension, benign  The following orders have not been finalized:  -     lovastatin (MEVACOR) 20 MG tablet  -     levothyroxine (SYNTHROID) 50 MCG tablet  -     furosemide (LASIX) 20 MG tablet  ACP (advance care planning)  -     [place] ADVANCED CARE PLAN FACE TO FACE, 1ST 30MIN [95885]  Need for hepatitis C screening test  -     Hepatitis C Antibody; Future  Other problems related to lifestyle  -     Hepatitis C Antibody; Future  Primary hypertension    Recommendations for Preventive Services Due: see orders and patient instructions/AVS.  Recommended screening schedule for the next 5-10 years is provided to the patient in written form: see Patient Instructions/AVS.     Return for Medicare Annual Wellness Visit in 1 year. Subjective       Patient's complete Health Risk Assessment and screening values have been reviewed and are found in Flowsheets. The following problems were reviewed today and where indicated follow up appointments were made and/or referrals ordered.     Positive Risk Factor Screenings with Interventions:             General Health and ACP:  General  In general, how would you say your health is?: Very Good  In the past 7 days, have you experienced any of the following: New or Increased Pain, New or Increased Fatigue, Loneliness, Social Isolation, Stress or Anger?: No  Do you get the social and emotional support that you need?: Yes    Advance Directives       Power of  Living Will ACP-Advance Directive ACP-Power of     Not on File Not on File Not on File Not on File          General Health Risk Interventions:      Health Habits/Nutrition:  Physical Activity: Not on file     Have you lost any weight without trying in the past 3 months?: No  Body mass index: (!) 33.63  Have you seen the dentist within the past year?: (!) No  Health Habits/Nutrition Interventions:      Hearing/Vision:  Do you or your family notice any trouble with your hearing that hasn't been managed with hearing aids?: No  Do you have difficulty driving, watching TV, or doing any of your daily activities because of your eyesight?: No  Have you had an eye exam within the past year?: (!) No  No results found. Hearing/Vision Interventions:              Objective   Vitals:    11/16/22 1113   BP: 128/74   Site: Left Upper Arm   Weight: 178 lb (80.7 kg)   Height: 5' 1\" (1.549 m)      Body mass index is 33.63 kg/m². Allergies   Allergen Reactions    Bactrim [Sulfamethoxazole-Trimethoprim] Hives, Itching and Rash     Prior to Visit Medications    Medication Sig Taking?  Authorizing Provider   furosemide (LASIX) 20 MG tablet Take 1 tablet by mouth daily Yes Onesimo Nyhan, MD   levothyroxine (SYNTHROID) 50 MCG tablet TAKE 1 TABLET BY MOUTH EVERY DAY Yes Onesimo Nyhan, MD   lovastatin (MEVACOR) 20 MG tablet Take 1 tablet by mouth nightly Yes Onesimo Nyhan, MD   sertraline (ZOLOFT) 25 MG tablet  Yes Historical Provider, MD   traZODone (DESYREL) 100 MG tablet  Yes Historical Provider, MD   risperiDONE (RISPERDAL) 1 MG tablet  Yes Historical Provider, MD   diphenhydrAMINE HCl, Sleep, 25 MG TBDP Take 50 mg by mouth nightly as needed OTC Yes Historical Provider, MD   benzonatate (TESSALON) 100 MG capsule TAKE 1 TO 2 CAPSULES BY MOUTH THREE TIMES DAILY AS NEEDED FOR COUGH  Patient not taking: No sig reported  Onesimo Nyhan, MD       [facility] (Including outside providers/suppliers regularly involved in providing care):   Patient Care Team:  Onesimo Nyhan, MD as PCP - General (Internal Medicine)  Onesimo Nyhan, MD as PCP - REHABILITATION [facility] Empaneled Provider     Reviewed and updated this visit:  Tobacco  Allergies  Meds  Med Hx  Surg Hx  Soc Hx  Fam Hx

## 2022-11-16 NOTE — PROGRESS NOTES
Subjective:      Patient ID: Juan R Dewey is a 60 y.o. male. The patient is asymptomatic today. His memory is normal.  He is current with his influenza, pneumonia vaccine. He gets regular Cologuard testing he does not want colonoscopic exam.  He has been treated with antipsychotic medication for his schizophrenia mixed hyperlipidemia and hypertension. He has abnormal gait due to infantile cerebral palsy  He is asymptomatic today and his blood pressure is well controlled. He has had no rash in his groin area  Today on May 16th 2022, he is asymptomatic. He is being treated for hypertension and hyperlipidemia and that is the reason for his visit today. It is to be noted that he is known to have schizophrenia and requires laboratory work-up to monitor for drug side effects from antipsychotic medication  Today on November 16, asymptomatic but he is gaining weight. Review of Systems   Constitutional:  Negative for fatigue and fever. Respiratory:  Negative for shortness of breath. Gastrointestinal:  Negative for diarrhea. He has soft and more frequent stools but he has no mucus or blood   Neurological:  Negative for weakness. Objective:   Physical Exam  Vitals reviewed. Constitutional:       General: He is not in acute distress. Appearance: He is well-developed. He is not diaphoretic. HENT:      Head: Normocephalic and atraumatic. No abrasion, contusion or laceration. Right Ear: External ear normal.      Left Ear: External ear normal.      Nose: No septal deviation, laceration or mucosal edema. Mouth/Throat:      Mouth: Mucous membranes are not pale. Pharynx: No oropharyngeal exudate. Eyes:      General: No scleral icterus. Right eye: No discharge. Left eye: No discharge. Pupils: Pupils are equal, round, and reactive to light. Neck:      Thyroid: No thyroid mass or thyromegaly. Vascular: No carotid bruit or hepatojugular reflux. Cardiovascular:      Rate and Rhythm: Normal rate and regular rhythm. Heart sounds: Normal heart sounds. No murmur heard. Pulmonary:      Effort: Pulmonary effort is normal.      Breath sounds: Normal breath sounds. No wheezing or rales. Abdominal:      General: There is no distension. Palpations: Abdomen is soft. Abdomen is not rigid. There is no mass. Tenderness: There is no abdominal tenderness. There is no rebound. Hernia: No hernia is present. There is no hernia in the ventral area. Musculoskeletal:         General: No tenderness. Normal range of motion. Cervical back: Neck supple. Right lower leg: Edema present. Left lower leg: Edema present. Lymphadenopathy:      Cervical: No cervical adenopathy. Upper Body:      Right upper body: No supraclavicular or epitrochlear adenopathy. Left upper body: No supraclavicular or epitrochlear adenopathy. Skin:     General: Skin is warm and dry. Coloration: Skin is not pale. Findings: No bruising, erythema, laceration, petechiae or rash. Nails: There is no clubbing. Neurological:      Mental Status: He is alert and oriented to person, place, and time. Cranial Nerves: No cranial nerve deficit. Motor: Weakness, tremor, atrophy and abnormal muscle tone (cerebral palsy) present. No seizure activity. Coordination: Coordination abnormal.      Gait: Gait abnormal.      Comments: Has cerebral palsy     Psychiatric:         Speech: Speech normal.         Behavior: Behavior normal. Behavior is cooperative. Thought Content: Thought content normal.         Judgment: Judgment normal.       Assessment:       Diagnosis Orders   1. Medicare annual wellness visit, subsequent  Hepatitis C Antibody      2. Essential hypertension, benign = controlled       3. ACP (advance care planning)  [place] ADVANCED CARE PLAN FACE TO [address], [address] [[id number]]      4.  Need for hepatitis C screening test  Hepatitis C Antibody      5. Other problems related to lifestyle  Hepatitis C Antibody      6. Primary hypertension        7. Acquired hypothyroidism = he is on thyroxine       8. Infantile cerebral palsy (HCC) = his ambulation is limited       9. Mixed hyperlipidemia = we will repeat lipid profile       10. Paranoid schizophrenia ([facility] = he is on antipsychotic medication       Return in 12 weeks (on 2/8/2023) for Medicare Annual Wellness Visit in 1 year, Follow Up. Orders Placed This Encounter   Procedures    Hepatitis C Antibody     Standing Status:   Future     Standing Expiration Date:   11/16/2023    CBC with Auto Differential     Standing Status:   Future     Standing Expiration Date:   11/16/2023    Comprehensive Metabolic Panel     Standing Status:   Future     Standing Expiration Date:   11/16/2023    Lipid Panel     Order Specific Question:   Is Patient Fasting?/# of Hours     Answer:   fasting    Urinalysis with Microscopic     Standing Status:   Future     Standing Expiration Date:   11/16/2023     Order Specific Question:   SPECIFY(EX-CATH,MIDSTREAM,CYSTO,ETC)?      Answer:   clean catch    Sedimentation rate, automated     Standing Status:   Future     Standing Expiration Date:   11/16/2023    TSH     Standing Status:   Future     Standing Expiration Date:   11/16/2023    [place] ADVANCED CARE PLAN FACE TO FACE, 1ST 30MIN [[id number]]     Orders Placed This Encounter   Medications    furosemide (LASIX) 20 MG tablet     Sig: Take 1 tablet by mouth daily     Dispense:  90 tablet     Refill:  3    levothyroxine (SYNTHROID) 50 MCG tablet     Sig: TAKE 1 TABLET BY MOUTH EVERY DAY     Dispense:  90 tablet     Refill:  3    lovastatin (MEVACOR) 20 MG tablet     Sig: Take 1 tablet by mouth nightly     Dispense:  90 tablet     Refill:  3           Plan:            [facility]    Soila Schmitt is here for Medicare AWV    Assessment & Plan   Medicare annual wellness visit, subsequent  - Hepatitis C Antibody; Future  Essential hypertension, benign  -     furosemide (LASIX) 20 MG tablet; Take 1 tablet by mouth daily, Disp-90 tablet, R-3Normal  -     levothyroxine (SYNTHROID) 50 MCG tablet; TAKE 1 TABLET BY MOUTH EVERY DAY, Disp-90 tablet, R-3Normal  -     lovastatin (MEVACOR) 20 MG tablet; Take 1 tablet by mouth nightly, Disp-90 tablet, R-3Normal  ACP (advance care planning)  -     [place] ADVANCED CARE PLAN FACE TO FACE, 1ST 30MIN [[id number]]  Need for hepatitis C screening test  -     Hepatitis C Antibody; Future  Other problems related to lifestyle  -     Hepatitis C Antibody; Future  Primary hypertension  -     CBC with Auto Differential; Future  -     Comprehensive Metabolic Panel; Future  -     Lipid Panel  -     Urinalysis with Microscopic; Future  -     Sedimentation rate, automated; Future  -     TSH; Future  Acquired hypothyroidism  Infantile cerebral palsy ([facility] [address]  Mixed hyperlipidemia  Paranoid schizophrenia ([facility] [address]    Recommendations for Preventive Services Due: see orders and patient instructions/AVS.  Recommended screening schedule for the next 5-10 years is provided to the patient in written form: see Patient Instructions/AVS.     Return in 12 weeks (on 2/8/2023) for Medicare Annual Wellness Visit in 1 year. Subjective       Patient's complete Health Risk Assessment and screening values have been reviewed and are found in Flowsheets. The following problems were reviewed today and where indicated follow up appointments were made and/or referrals ordered.     Positive Risk Factor Screenings with Interventions:             General Health and ACP:  General  In general, how would you say your health is?: Very Good  In the past 7 days, have you experienced any of the following: New or Increased Pain, New or Increased Fatigue, Loneliness, Social Isolation, Stress or Anger?: No  Do you get the social and emotional support that you need?: Yes    Advance Directives       Power of [address] [place] [address] Will ACP-Advance Directive ACP-Power of     Not on File Not on File Not on File Not on File          General Health Risk Interventions:      Health Habits/Nutrition:  Physical Activity: Not on file     Have you lost any weight without trying in the past 3 months?: No  Body mass index: (!) 33.63  Have you seen the dentist within the past year?: (!) No  Health Habits/Nutrition Interventions:      Hearing/Vision:  Do you or your family notice any trouble with your hearing that hasn't been managed with hearing aids?: No  Do you have difficulty driving, watching TV, or doing any of your daily activities because of your eyesight?: No  Have you had an eye exam within the past year?: (!) No  No results found. Hearing/Vision Interventions:              Objective   Vitals:    11/16/22 1113   BP: 128/74   Site: Left Upper Arm   Weight: 178 lb (80.7 kg)   Height: 5' 1\" (1.549 m)      Body mass index is 33.63 kg/m². Allergies   Allergen Reactions    Bactrim [Sulfamethoxazole-Trimethoprim] Hives, Itching and Rash     Prior to Visit Medications    Medication Sig Taking?  Authorizing Provider   furosemide (LASIX) 20 MG tablet Take 1 tablet by mouth daily Yes Gabi Braun MD   levothyroxine (SYNTHROID) 50 MCG tablet TAKE 1 TABLET BY MOUTH EVERY DAY Yes Gabi Braun MD   lovastatin (MEVACOR) 20 MG tablet Take 1 tablet by mouth nightly Yes Gabi Braun MD   sertraline (ZOLOFT) 25 MG tablet  Yes Historical Provider, MD   traZODone (DESYREL) 100 MG tablet  Yes Historical Provider, MD   risperiDONE (RISPERDAL) 1 MG tablet  Yes Historical Provider, MD   diphenhydrAMINE HCl, Sleep, 25 MG TBDP Take 50 mg by mouth nightly as needed OTC Yes Historical Provider, MD   benzonatate (TESSALON) 100 MG capsule TAKE 1 TO 2 CAPSULES BY MOUTH THREE TIMES DAILY AS NEEDED FOR COUGH  Patient not taking: No sig reported  Gabi Braun MD       CareTeam (Including outside providers/suppliers regularly involved in providing care):   Patient Care Team:  Savanna Webster MD as PCP - General (Internal Medicine)  Savanna Webster MD as PCP - [facility] Empaneled Provider     Reviewed and updated this visit:  Tobacco  Allergies  Meds  Med Hx  Surg Hx  Soc Hx  Fam Hx      He is ambulatory capacity is limited and is gaining weight advised to cut down the number of calories consumes per day.

## 2022-12-06 DIAGNOSIS — I10 ESSENTIAL HYPERTENSION, BENIGN: ICD-10-CM

## 2022-12-06 RX ORDER — LOVASTATIN 20 MG/1
TABLET ORAL
Qty: 90 TABLET | Refills: 3 | Status: SHIPPED | OUTPATIENT
Start: 2022-12-06

## 2022-12-20 ENCOUNTER — PATIENT MESSAGE (OUTPATIENT)
Dept: INTERNAL MEDICINE CLINIC | Age: 57
End: 2022-12-20

## 2022-12-20 DIAGNOSIS — I10 ESSENTIAL HYPERTENSION, BENIGN: Primary | ICD-10-CM

## 2022-12-20 RX ORDER — ADHESIVE BANDAGE 3/4"
BANDAGE TOPICAL
Qty: 1 EACH | Refills: 0 | Status: SHIPPED | OUTPATIENT
Start: 2022-12-20

## 2022-12-20 NOTE — TELEPHONE ENCOUNTER
From: Shyann Hull  To: Dr. Robin Self: 12/20/2022 7:41 AM EST  Subject: Blood Pressure wrist cuff     This message is being sent by Farzaneh Weber on behalf of Shyann Hull. I thought when we were there with my brother Jeana Sagastume that the nurse was going to order a wrist blood pressure cuff for him. We have not heard anything about it. Was not sure if he needed a script for it , so that we could go to the pharmacy counter and see if they have any for RayErnestine Randall - Sister

## 2023-01-18 DIAGNOSIS — I10 ESSENTIAL HYPERTENSION, BENIGN: ICD-10-CM

## 2023-01-18 RX ORDER — LEVOTHYROXINE SODIUM 0.07 MG/1
TABLET ORAL
Qty: 90 TABLET | Refills: 1 | Status: SHIPPED | OUTPATIENT
Start: 2023-01-18

## 2023-01-24 ENCOUNTER — OFFICE VISIT (OUTPATIENT)
Dept: INTERNAL MEDICINE CLINIC | Age: 58
End: 2023-01-24

## 2023-01-24 VITALS
WEIGHT: 165 LBS | BODY MASS INDEX: 31.18 KG/M2 | OXYGEN SATURATION: 98 % | SYSTOLIC BLOOD PRESSURE: 130 MMHG | HEART RATE: 80 BPM | DIASTOLIC BLOOD PRESSURE: 70 MMHG

## 2023-01-24 DIAGNOSIS — G80.9 INFANTILE CEREBRAL PALSY (HCC): ICD-10-CM

## 2023-01-24 DIAGNOSIS — E03.9 ACQUIRED HYPOTHYROIDISM: ICD-10-CM

## 2023-01-24 DIAGNOSIS — F20.0 PARANOID SCHIZOPHRENIA (HCC): ICD-10-CM

## 2023-01-24 DIAGNOSIS — R73.09 ELEVATED GLUCOSE: Primary | ICD-10-CM

## 2023-01-24 DIAGNOSIS — E78.2 MIXED HYPERLIPIDEMIA: ICD-10-CM

## 2023-01-24 DIAGNOSIS — I10 ESSENTIAL HYPERTENSION, BENIGN: ICD-10-CM

## 2023-01-24 LAB — HBA1C MFR BLD: 5.5 %

## 2023-01-24 ASSESSMENT — PATIENT HEALTH QUESTIONNAIRE - PHQ9
SUM OF ALL RESPONSES TO PHQ9 QUESTIONS 1 & 2: 0
SUM OF ALL RESPONSES TO PHQ QUESTIONS 1-9: 0
SUM OF ALL RESPONSES TO PHQ QUESTIONS 1-9: 0
1. LITTLE INTEREST OR PLEASURE IN DOING THINGS: 0
SUM OF ALL RESPONSES TO PHQ QUESTIONS 1-9: 0
2. FEELING DOWN, DEPRESSED OR HOPELESS: 0
SUM OF ALL RESPONSES TO PHQ QUESTIONS 1-9: 0

## 2023-01-24 ASSESSMENT — ENCOUNTER SYMPTOMS
DIARRHEA: 0
SHORTNESS OF BREATH: 0

## 2023-01-24 NOTE — PROGRESS NOTES
Visit Information    Have you changed or started any medications since your last visit including any over-the-counter medicines, vitamins, or herbal medicines? no   Are you having any side effects from any of your medications? -  no  Have you stopped taking any of your medications? Is so, why? -  no    Have you seen any other physician or provider since your last visit? No  Have you had any other diagnostic tests since your last visit? No  Have you been seen in the emergency room and/or had an admission to a hospital since we last saw you? No  Have you had your routine dental cleaning in the past 6 months? no    Have you activated your [employer] account? If not, what are your barriers?  Yes     Patient Care Team:  Meme Vaca MD as PCP - General (Internal Medicine)  Meme Vaca MD as PCP - [facility]    Medical History Review  Past Medical, Family, and Social History reviewed and does contribute to the patient presenting condition    Health Maintenance   Topic Date Due    HIV screen  Never done    DTaP/Tdap/Td vaccine (1 - Tdap) Never done    Shingles vaccine (1 of 2) Never done    COVID-19 Vaccine (3 - Booster for Jiménez Peter series) 07/02/2021    Diabetes screen  06/12/2022    Lipids  11/16/2023    Depression Screen  11/16/2023    Annual Wellness Visit (AWV)  11/17/2023    Colorectal Cancer Screen  11/24/2023    Flu vaccine  Completed    Hepatitis C screen  Completed    Hepatitis A vaccine  Aged Out    Hib vaccine  Aged Out    Meningococcal (ACWY) vaccine  Aged Out    Pneumococcal 0-64 years Vaccine  Aged Out

## 2023-01-24 NOTE — PROGRESS NOTES
Subjective:      Patient ID: Edmon Dakins is a 59 y.o. male. The patient is asymptomatic today. His memory is normal.  He is current with his influenza, pneumonia vaccine. He gets regular Cologuard testing he does not want colonoscopic exam.  He has been treated with antipsychotic medication for his schizophrenia mixed hyperlipidemia and hypertension. He has abnormal gait due to infantile cerebral palsy  He is asymptomatic today and his blood pressure is well controlled. He has had no rash in his groin area  Today on May 16th 2022, he is asymptomatic. He is being treated for hypertension and hyperlipidemia and that is the reason for his visit today. It is to be noted that he is known to have schizophrenia and requires laboratory work-up to monitor for drug side effects from antipsychotic medication  Today on November 16, asymptomatic but he is gaining weight. Today on January 24, 2023, he is asymptomatic. He has lost 13 pounds since his last visit. He was advised to cut down snacks and carbonated beverages. He was also found to have slight elevation of TSH and his Synthroid dose was increased      Review of Systems   Constitutional:  Negative for fatigue and fever. Respiratory:  Negative for shortness of breath. Gastrointestinal:  Negative for diarrhea. Neurological:  Negative for weakness. Objective:   Physical Exam  Vitals reviewed. Constitutional:       General: He is not in acute distress. Appearance: He is well-developed. He is not diaphoretic. HENT:      Head: Normocephalic and atraumatic. No abrasion, contusion or laceration. Right Ear: External ear normal.      Left Ear: External ear normal.      Nose: No septal deviation, laceration or mucosal edema. Mouth/Throat:      Mouth: Mucous membranes are not pale. Pharynx: No oropharyngeal exudate. Eyes:      General: No scleral icterus. Right eye: No discharge. Left eye: No discharge.       Pupils: Pupils are equal, round, and reactive to light. Neck:      Thyroid: No thyroid mass or thyromegaly. Vascular: No carotid bruit or hepatojugular reflux. Cardiovascular:      Rate and Rhythm: Normal rate and regular rhythm. Heart sounds: Normal heart sounds. No murmur heard. Pulmonary:      Effort: Pulmonary effort is normal.      Breath sounds: Normal breath sounds. No wheezing or rales. Abdominal:      General: There is no distension. Palpations: Abdomen is soft. Abdomen is not rigid. There is no mass. Tenderness: There is no abdominal tenderness. There is no rebound. Hernia: No hernia is present. There is no hernia in the ventral area. Musculoskeletal:         General: No tenderness. Normal range of motion. Cervical back: Neck supple. Right lower leg: Edema present. Left lower leg: Edema present. Lymphadenopathy:      Cervical: No cervical adenopathy. Upper Body:      Right upper body: No supraclavicular or epitrochlear adenopathy. Left upper body: No supraclavicular or epitrochlear adenopathy. Skin:     General: Skin is warm and dry. Coloration: Skin is not pale. Findings: No bruising, erythema, laceration, petechiae or rash. Nails: There is no clubbing. Neurological:      Mental Status: He is alert and oriented to person, place, and time. Cranial Nerves: No cranial nerve deficit. Motor: Weakness, tremor, atrophy and abnormal muscle tone (cerebral palsy) present. No seizure activity. Coordination: Coordination abnormal.      Gait: Gait abnormal.      Comments: Has cerebral palsy     Psychiatric:         Speech: Speech normal.         Behavior: Behavior normal. Behavior is cooperative. Thought Content: Thought content normal.         Judgment: Judgment normal.       Assessment / Plan:      Diagnosis Orders   1. Elevated glucose = A1c today was 5.5%       2.  [facility] = he follows with a psychiatrist       3. Infantile cerebral palsy (HCC) =  no change in his ambulation       4. Essential hypertension, benign = controlled       5. Acquired hypothyroidism = his TSH was 5.34 and his Synthroid was increased to 75 mcg daily       6. Mixed hyperlipidemia = his LDL is 101 and triglycerides are slightly elevated -224       Return for Follow Up, hypertension, hyperlipidemia. No orders of the defined types were placed in this encounter. No orders of the defined types were placed in this encounter.

## 2023-04-17 ENCOUNTER — HOSPITAL ENCOUNTER (EMERGENCY)
Age: 58
Discharge: HOME | End: 2023-04-17
Attending: EMERGENCY MEDICINE
Payer: MEDICARE

## 2023-04-17 VITALS
DIASTOLIC BLOOD PRESSURE: 71 MMHG | HEIGHT: 61 IN | OXYGEN SATURATION: 93 % | TEMPERATURE: 98.5 F | BODY MASS INDEX: 31.15 KG/M2 | HEART RATE: 65 BPM | SYSTOLIC BLOOD PRESSURE: 138 MMHG | RESPIRATION RATE: 18 BRPM | WEIGHT: 165 LBS

## 2023-04-17 DIAGNOSIS — L08.9 INFECTED WOUND: ICD-10-CM

## 2023-04-17 DIAGNOSIS — L03.115 CELLULITIS OF RIGHT LOWER EXTREMITY: Primary | ICD-10-CM

## 2023-04-17 DIAGNOSIS — T14.8XXA INFECTED WOUND: ICD-10-CM

## 2023-04-17 PROCEDURE — 99283 EMERGENCY DEPT VISIT LOW MDM: CPT

## 2023-04-17 PROCEDURE — 6370000000 HC RX 637 (ALT 250 FOR IP): Performed by: PHYSICIAN ASSISTANT

## 2023-04-17 RX ORDER — CLINDAMYCIN HYDROCHLORIDE 150 MG/1
300 CAPSULE ORAL ONCE
Status: COMPLETED | OUTPATIENT
Start: 2023-04-17 | End: 2023-04-17

## 2023-04-17 RX ORDER — CLINDAMYCIN HYDROCHLORIDE 300 MG/1
300 CAPSULE ORAL 3 TIMES DAILY
Qty: 30 CAPSULE | Refills: 0 | Status: SHIPPED | OUTPATIENT
Start: 2023-04-17 | End: 2023-04-27

## 2023-04-17 RX ADMIN — CLINDAMYCIN HYDROCHLORIDE 300 MG: 150 CAPSULE ORAL at 19:35

## 2023-04-17 ASSESSMENT — LIFESTYLE VARIABLES
HOW OFTEN DO YOU HAVE A DRINK CONTAINING ALCOHOL: NEVER
HOW MANY STANDARD DRINKS CONTAINING ALCOHOL DO YOU HAVE ON A TYPICAL DAY: PATIENT DOES NOT DRINK

## 2023-04-17 ASSESSMENT — PAIN SCALES - GENERAL: PAINLEVEL_OUTOF10: 5

## 2023-04-17 ASSESSMENT — PAIN - FUNCTIONAL ASSESSMENT: PAIN_FUNCTIONAL_ASSESSMENT: 0-10

## 2023-04-17 ASSESSMENT — ENCOUNTER SYMPTOMS
NAUSEA: 0
VOMITING: 0

## 2023-04-17 NOTE — DISCHARGE INSTRUCTIONS
Please keep wound clean. Recommend returning to ED or following up with PCP in 48 hours for wound recheck. Return sooner if you develop worsening redness, swelling, drainage, fevers, chills, vomiting.

## 2023-04-17 NOTE — ED PROVIDER NOTES
[address] ED  eMERGENCY dEPARTMENT eNCOUnter   Independent Attestation     Pt Name: Qi Gunderson  MRN: [id number]  [place] 1965  Date of evaluation: 4/17/23       Qi Gunderson is a 59 y.o. male who presents with Wound Infection (Pt  ED with wound to right posterior leg/Denies injury)        Based on the medical record, the care appears appropriate. I was personally available for consultation in the Emergency Department.     Modesta Grajeda MD  Attending Emergency  Physician               Modesta Grajeda MD  04/17/23 0692

## 2023-04-18 NOTE — ED PROVIDER NOTES
EMERGENCY DEPARTMENT ENCOUNTER    Pt Name: Fernie Walsh  MRN: [id number]  [place] 1965  Date of evaluation: 4/17/23  CHIEF COMPLAINT       Chief Complaint   Patient presents with    Wound Infection     Pt  ED with wound to right posterior leg  Denies injury     HISTORY OF PRESENT ILLNESS   Presents with sister for evaluation of wound infection to the right lower leg. Began around 1 week ago. There was no injury. Pt denies any known insect bite. He denies drainage. Does report some pain. Denies fever, chills, nausea, emesis,  he is not diabetic. No other complaints. The history is provided by the patient and a relative. REVIEW OF SYSTEMS     Review of Systems   Constitutional:  Negative for fever. Gastrointestinal:  Negative for nausea and vomiting. Skin:  Positive for rash and wound. All other systems reviewed and are negative.   PASTMEDICAL HISTORY     Past Medical History:   Diagnosis Date    Bipolar disorder, unspecified ([facility]     Depression     Edema     Essential hypertension, benign     Hypothyroid     Impetigo     Infantile cerebral palsy, unspecified     Other and unspecified hyperlipidemia     Schizophrenia, schizo-affective (HCC)     Shortness of breath     Unspecified hypothyroidism      Past Problem List  Patient Active Problem List   Diagnosis Code    Essential hypertension, benign I10    Infantile cerebral palsy ([facility] G80.9    Hypothyroidism E03.9    Paranoid schizophrenia ([facility] F20.0    Mixed hyperlipidemia E78.2    Functional diarrhea K59.1    Encounter for annual wellness exam in Medicare patient Z00.00     SURGICAL HISTORY       Past Surgical History:   Procedure Laterality Date    KNEE SURGERY Bilateral     valgus deformity     CURRENT MEDICATIONS       Previous Medications    BENZONATATE (TESSALON) 100 MG CAPSULE    TAKE 1 TO 2 CAPSULES BY MOUTH THREE TIMES DAILY AS NEEDED FOR COUGH    DIPHENHYDRAMINE HCL, SLEEP, 25 MG TBDP    Take 50 mg by mouth

## 2023-04-19 ENCOUNTER — OFFICE VISIT (OUTPATIENT)
Dept: INTERNAL MEDICINE CLINIC | Age: 58
End: 2023-04-19

## 2023-04-19 VITALS
SYSTOLIC BLOOD PRESSURE: 132 MMHG | BODY MASS INDEX: 31.18 KG/M2 | WEIGHT: 165 LBS | HEART RATE: 66 BPM | OXYGEN SATURATION: 97 % | DIASTOLIC BLOOD PRESSURE: 64 MMHG

## 2023-04-19 DIAGNOSIS — L03.115 CELLULITIS OF RIGHT LOWER EXTREMITY: ICD-10-CM

## 2023-04-19 DIAGNOSIS — E78.2 MIXED HYPERLIPIDEMIA: ICD-10-CM

## 2023-04-19 DIAGNOSIS — I10 ESSENTIAL HYPERTENSION, BENIGN: Primary | ICD-10-CM

## 2023-04-19 DIAGNOSIS — G80.9 INFANTILE CEREBRAL PALSY (HCC): ICD-10-CM

## 2023-04-19 RX ORDER — LEVOTHYROXINE SODIUM 0.05 MG/1
TABLET ORAL
COMMUNITY
Start: 2023-04-12

## 2023-04-19 SDOH — ECONOMIC STABILITY: INCOME INSECURITY: HOW HARD IS IT FOR YOU TO PAY FOR THE VERY BASICS LIKE FOOD, HOUSING, MEDICAL CARE, AND HEATING?: NOT HARD AT ALL

## 2023-04-19 SDOH — ECONOMIC STABILITY: HOUSING INSECURITY
IN THE LAST 12 MONTHS, WAS THERE A TIME WHEN YOU DID NOT HAVE A STEADY PLACE TO SLEEP OR SLEPT IN A SHELTER (INCLUDING NOW)?: NO

## 2023-04-19 SDOH — ECONOMIC STABILITY: FOOD INSECURITY: WITHIN THE PAST 12 MONTHS, YOU WORRIED THAT YOUR FOOD WOULD RUN OUT BEFORE YOU GOT MONEY TO BUY MORE.: NEVER TRUE

## 2023-04-19 SDOH — ECONOMIC STABILITY: FOOD INSECURITY: WITHIN THE PAST 12 MONTHS, THE FOOD YOU BOUGHT JUST DIDN'T LAST AND YOU DIDN'T HAVE MONEY TO GET MORE.: NEVER TRUE

## 2023-04-19 ASSESSMENT — ENCOUNTER SYMPTOMS
SHORTNESS OF BREATH: 0
DIARRHEA: 0

## 2023-04-19 NOTE — PROGRESS NOTES
you changed or started any medications since your last visit including any over-the-counter medicines, vitamins, or herbal medicines? no   Are you having any side effects from any of your medications? -  no  Have you stopped taking any of your medications? Is so, why? -  no    Have you seen any other physician or provider since your last visit? No  Have you had any other diagnostic tests since your last visit? No  Have you been seen in the emergency room and/or had an admission to a hospital since we last saw you? Yes - Records Obtained  Have you had your routine dental cleaning in the past 6 months? no    Have you activated your [employer] account? If not, what are your barriers?  Yes     Patient Care Team:  Dustin Farmer MD as PCP - General (Internal Medicine)  Dustin Farmer MD as PCP - Empaneled Provider    Medical History Review  Past Medical, Family, and Social History reviewed and does not contribute to the patient presenting condition    Health Maintenance   Topic Date Due    HIV screen  Never done    DTaP/Tdap/Td vaccine (1 - Tdap) Never done    Shingles vaccine (1 of 2) Never done    COVID-19 Vaccine (3 - Booster for Jiménez Peter series) 07/02/2021    Lipids  11/16/2023    Annual Wellness Visit (AWV)  11/17/2023    Colorectal Cancer Screen  11/24/2023    Depression Screen  01/24/2024    Diabetes screen  01/24/2026    Flu vaccine  Completed    Hepatitis C screen  Completed    Hepatitis A vaccine  Aged Out    Hib vaccine  Aged Out    Meningococcal (ACWY) vaccine  Aged Out    Pneumococcal 0-64 years Vaccine  Aged Out    Prostate Specific Antigen (PSA) Screening or Monitoring  Discontinued

## 2023-04-20 ENCOUNTER — HOSPITAL ENCOUNTER (OUTPATIENT)
Dept: WOUND CARE | Age: 58
Discharge: HOME | End: 2023-04-20
Payer: MEDICARE

## 2023-04-20 VITALS
SYSTOLIC BLOOD PRESSURE: 147 MMHG | TEMPERATURE: 96.2 F | WEIGHT: 165 LBS | HEART RATE: 57 BPM | DIASTOLIC BLOOD PRESSURE: 82 MMHG | BODY MASS INDEX: 31.15 KG/M2 | RESPIRATION RATE: 18 BRPM | HEIGHT: 61 IN

## 2023-04-20 DIAGNOSIS — L97.812 NON-PRESSURE CHRONIC ULCER OF OTHER PART OF RIGHT LOWER LEG WITH FAT LAYER EXPOSED (HCC): Primary | ICD-10-CM

## 2023-04-20 PROCEDURE — 87075 CULTR BACTERIA EXCEPT BLOOD: CPT

## 2023-04-20 PROCEDURE — 87070 CULTURE OTHR SPECIMN AEROBIC: CPT

## 2023-04-20 PROCEDURE — 87205 SMEAR GRAM STAIN: CPT

## 2023-04-20 PROCEDURE — 87176 TISSUE HOMOGENIZATION CULTR: CPT

## 2023-04-20 PROCEDURE — 99213 OFFICE O/P EST LOW 20 MIN: CPT

## 2023-04-20 PROCEDURE — 11042 DBRDMT SUBQ TIS 1ST 20SQCM/<: CPT

## 2023-04-20 RX ORDER — BACITRACIN, NEOMYCIN, POLYMYXIN B 400; 3.5; 5 [USP'U]/G; MG/G; [USP'U]/G
OINTMENT TOPICAL ONCE
OUTPATIENT
Start: 2023-04-20 | End: 2023-04-20

## 2023-04-20 RX ORDER — GINSENG 100 MG
CAPSULE ORAL ONCE
OUTPATIENT
Start: 2023-04-20 | End: 2023-04-20

## 2023-04-20 RX ORDER — LIDOCAINE 50 MG/G
OINTMENT TOPICAL ONCE
Status: CANCELLED | OUTPATIENT
Start: 2023-04-20 | End: 2023-04-20

## 2023-04-20 RX ORDER — LIDOCAINE HYDROCHLORIDE 20 MG/ML
JELLY TOPICAL ONCE
OUTPATIENT
Start: 2023-04-20 | End: 2023-04-20

## 2023-04-20 RX ORDER — BETAMETHASONE DIPROPIONATE 0.05 %
OINTMENT (GRAM) TOPICAL ONCE
OUTPATIENT
Start: 2023-04-20 | End: 2023-04-20

## 2023-04-20 RX ORDER — LIDOCAINE 40 MG/G
CREAM TOPICAL ONCE
Status: CANCELLED | OUTPATIENT
Start: 2023-04-20 | End: 2023-04-20

## 2023-04-20 RX ORDER — BETAMETHASONE DIPROPIONATE 0.05 %
OINTMENT (GRAM) TOPICAL ONCE
Status: CANCELLED | OUTPATIENT
Start: 2023-04-20 | End: 2023-04-20

## 2023-04-20 RX ORDER — LIDOCAINE HYDROCHLORIDE 20 MG/ML
JELLY TOPICAL ONCE
Status: CANCELLED | OUTPATIENT
Start: 2023-04-20 | End: 2023-04-20

## 2023-04-20 RX ORDER — CLOBETASOL PROPIONATE 0.5 MG/G
OINTMENT TOPICAL ONCE
Status: CANCELLED | OUTPATIENT
Start: 2023-04-20 | End: 2023-04-20

## 2023-04-20 RX ORDER — BACITRACIN ZINC AND POLYMYXIN B SULFATE 500; 1000 [USP'U]/G; [USP'U]/G
OINTMENT TOPICAL ONCE
OUTPATIENT
Start: 2023-04-20 | End: 2023-04-20

## 2023-04-20 RX ORDER — GENTAMICIN SULFATE 1 MG/G
OINTMENT TOPICAL ONCE
Status: CANCELLED | OUTPATIENT
Start: 2023-04-20 | End: 2023-04-20

## 2023-04-20 RX ORDER — GENTAMICIN SULFATE 1 MG/G
OINTMENT TOPICAL ONCE
OUTPATIENT
Start: 2023-04-20 | End: 2023-04-20

## 2023-04-20 RX ORDER — LIDOCAINE 40 MG/G
CREAM TOPICAL ONCE
OUTPATIENT
Start: 2023-04-20 | End: 2023-04-20

## 2023-04-20 RX ORDER — LIDOCAINE HYDROCHLORIDE 40 MG/ML
SOLUTION TOPICAL ONCE
OUTPATIENT
Start: 2023-04-20 | End: 2023-04-20

## 2023-04-20 RX ORDER — CLOBETASOL PROPIONATE 0.5 MG/G
OINTMENT TOPICAL ONCE
OUTPATIENT
Start: 2023-04-20 | End: 2023-04-20

## 2023-04-20 RX ORDER — LIDOCAINE 50 MG/G
OINTMENT TOPICAL ONCE
OUTPATIENT
Start: 2023-04-20 | End: 2023-04-20

## 2023-04-20 RX ORDER — BACITRACIN ZINC AND POLYMYXIN B SULFATE 500; 1000 [USP'U]/G; [USP'U]/G
OINTMENT TOPICAL ONCE
Status: CANCELLED | OUTPATIENT
Start: 2023-04-20 | End: 2023-04-20

## 2023-04-20 RX ORDER — GINSENG 100 MG
CAPSULE ORAL ONCE
Status: CANCELLED | OUTPATIENT
Start: 2023-04-20 | End: 2023-04-20

## 2023-04-20 RX ORDER — BACITRACIN, NEOMYCIN, POLYMYXIN B 400; 3.5; 5 [USP'U]/G; MG/G; [USP'U]/G
OINTMENT TOPICAL ONCE
Status: CANCELLED | OUTPATIENT
Start: 2023-04-20 | End: 2023-04-20

## 2023-04-20 RX ORDER — LIDOCAINE HYDROCHLORIDE 40 MG/ML
SOLUTION TOPICAL ONCE
Status: COMPLETED | OUTPATIENT
Start: 2023-04-20 | End: 2023-04-20

## 2023-04-20 RX ADMIN — LIDOCAINE HYDROCHLORIDE 10 ML: 40 SOLUTION TOPICAL at 09:18

## 2023-04-20 ASSESSMENT — PAIN DESCRIPTION - DESCRIPTORS: DESCRIPTORS: ACHING

## 2023-04-20 ASSESSMENT — PAIN DESCRIPTION - LOCATION: LOCATION: LEG

## 2023-04-20 ASSESSMENT — PAIN DESCRIPTION - ONSET: ONSET: ON-GOING

## 2023-04-20 ASSESSMENT — PAIN DESCRIPTION - ORIENTATION: ORIENTATION: RIGHT;LOWER

## 2023-04-20 ASSESSMENT — PAIN DESCRIPTION - PAIN TYPE: TYPE: CHRONIC PAIN

## 2023-04-20 ASSESSMENT — PAIN SCALES - GENERAL: PAINLEVEL_OUTOF10: 10

## 2023-04-20 ASSESSMENT — PAIN DESCRIPTION - FREQUENCY: FREQUENCY: CONTINUOUS

## 2023-04-20 NOTE — PROGRESS NOTES
04/20/23 0918   Post-Procedure Length (cm) 6.3 cm 04/20/23 0918   Post-Procedure Width (cm) 2.7 cm 04/20/23 0918   Post-Procedure Depth (cm) 0.1 cm 04/20/23 0918   Post-Procedure Surface Area (cm^2) 17.01 cm^2 04/20/23 0918   Post-Procedure Volume (cm^3) 1.701 cm^3 04/20/23 0918   Wound Assessment Pink/red;Slough 04/20/23 0918   Drainage Amount Moderate 04/20/23 0918   Drainage Description Serosanguinous; Yellow 04/20/23 0918   Odor None 04/20/23 0918   Luz-wound Assessment Blanchable erythema 04/20/23 0918   Margins Attached edges 04/20/23 0918   Number of days: 2          Supplies Requested :     *Can substitute if needed     WOUND #: 1   PRIMARY DRESSING:  Alginate with silver pad   Cover and Secure with: 4X4 gauze pad, kerlix     FREQUENCY OF DRESSING CHANGES:  Daily       ADDITIONAL ITEMS:  [] Gloves Small  [x] Gloves Medium [] Gloves Large [] Gloves XLarge  [] Tape 1\" [x] Tape 2\" [] Tape 3\"  [] Medipore Tape  [] Saline  [] Vashe  [] Skin Prep   [] Adhesive Remover   [] Cotton Tip Applicators   [] Other:    Patient Wound(s) Debrided: [x] Yes if yes please add date 4/20/23   [] No    Debribement Type: Excisional/Sharp    Is the patient currently on an antibiotic for their Wound(s): [x] Yes if yes please add name and dose clindamycin 3x daily for 10 days   [] No    Patient currently being seen by Home Health: [] Yes   [x] No    Duration for needed supplies:  []15  [x]30  []60  []90 Days    Electronically signed by Andrea Garcia RN on 4/20/2023 at 9:34 AM     Provider Information:      PROVIDER'S NAME: Audrey Staton DPM  NWV-[id number]
of the [address] please contact your PCP or go to the nearest emergency room. The information contained in the After Visit Summary has been reviewed with me, the patient and/or responsible adult, by my health care provider(s). I had the opportunity to ask questions regarding this information.  I have elected to receive;      []After Visit Summary  [x]Comprehensive Discharge Instruction      Patient signature______________________________________Date:________   Electronically signed by Rossana Barba RN on 4/20/2023 at 9:27 AM   Electronically signed by Ryan Avendano DPM on 4/20/2023 at 8:54 AM          Electronically signed by Ryan Avendano DPM on 4/20/2023 at 9:35 AM

## 2023-04-20 NOTE — DISCHARGE INSTRUCTIONS
[facility] please contact your PCP or go to the nearest emergency room. The information contained in the After Visit Summary has been reviewed with me, the patient and/or responsible adult, by my health care provider(s). I had the opportunity to ask questions regarding this information.  I have elected to receive;      []After Visit Summary  [x]Comprehensive Discharge Instruction      Patient signature______________________________________Date:________   Electronically signed by Merced Lizarraga RN on 4/20/2023 at 9:27 AM   Electronically signed by Margot Galindo DPM on 4/20/2023 at 8:54 AM

## 2023-04-20 NOTE — PLAN OF CARE
Problem: Discharge Planning  Goal: Discharge to home or other facility with appropriate resources  Outcome: Progressing     Problem: Pain  Goal: Verbalizes/displays adequate comfort level or baseline comfort level  Outcome: Progressing     Problem: Cognitive:  Goal: Understands risk factors for wounds  Description: Understands risk factors for wounds  Outcome: Progressing     Problem: Wound:  Goal: Will show signs of wound healing; wound closure and no evidence of infection  Description: Will show signs of wound healing; wound closure and no evidence of infection  Outcome: Progressing     Problem: Venous:  Goal: Signs of wound healing will improve  Description: Signs of wound healing will improve  Outcome: Progressing     Problem: Compression therapy:  Goal: Will be free from complications associated with compression therapy  Description: Will be free from complications associated with compression therapy  Outcome: Progressing     Problem: Falls - Risk of:  Goal: Will remain free from falls  Description: Will remain free from falls  Outcome: Progressing

## 2023-04-23 LAB
MICROORGANISM SPEC CULT: NORMAL
MICROORGANISM SPEC CULT: NORMAL
MICROORGANISM/AGENT SPEC: NORMAL
MICROORGANISM/AGENT SPEC: NORMAL
SPECIMEN DESCRIPTION: NORMAL

## 2023-04-24 NOTE — DISCHARGE INSTRUCTIONS
[address]                                 Visit  Discharge Instructions / Physician Orders  DATE: 4/25/23     Home Care: none     SUPPLIES ORDERED THRU:   [facility] [facility]                  DATE LAST SUPPLIED  4/20/23 (did not receive as of 4/25/23)   If you have not heard from the dressing supply company within 2 -3 days of visit please call company to check on the status of your order. [employer]- [phone]     Wound Location:  Right lower leg     Cleanse with: Keep dry and intact     Dressing Orders:  Primary dressing    silvercel,  to wound, wrap the leg with zinc unna boot            Frequency:Keep dry and intact   wear slippers with socks around the house  Additional Orders: Increase protein to diet (meat, cheese, eggs, fish, peanut butter, nuts and beans)  Multivitamin daily     OFFLOADING [] YES  TYPE:                  [x] NA     Weekly wound care visits until determined otherwise. Antibiotic therapy-wound care related YES [] NO [] NA[]        MY CHART []     Smart Device  []                      [facility] in [facility] week                                                                                                   (Please note your next appointment above and if you are unable to keep, kindly give a 24 hour notice. Thank you.)  If more than 15 min late we cannot guarantee you will be seen due to clinician schedule  Per Policy, Excessive cancellation will call for dismissal from program.  If you experience any of the following, please call the [facility] during business hours:  [phone]     * Increase in Pain  * Temperature over 101  * Increase in drainage from your wound  * Drainage with a foul odor  * Bleeding  * Increase in swelling  * Need for compression bandage changes due to slippage, breakthrough drainage.      If you need medical attention outside of the business hours of

## 2023-04-25 ENCOUNTER — HOSPITAL ENCOUNTER (OUTPATIENT)
Dept: WOUND CARE | Age: 58
Discharge: HOME | End: 2023-04-25
Payer: MEDICARE

## 2023-04-25 VITALS
WEIGHT: 165 LBS | DIASTOLIC BLOOD PRESSURE: 75 MMHG | TEMPERATURE: 98.6 F | HEIGHT: 61 IN | SYSTOLIC BLOOD PRESSURE: 143 MMHG | BODY MASS INDEX: 31.15 KG/M2 | HEART RATE: 68 BPM | RESPIRATION RATE: 18 BRPM

## 2023-04-25 DIAGNOSIS — L97.812 NON-PRESSURE CHRONIC ULCER OF OTHER PART OF RIGHT LOWER LEG WITH FAT LAYER EXPOSED (HCC): Primary | ICD-10-CM

## 2023-04-25 DIAGNOSIS — R60.0 LOCALIZED EDEMA: ICD-10-CM

## 2023-04-25 DIAGNOSIS — G80.9 INFANTILE CEREBRAL PALSY (HCC): ICD-10-CM

## 2023-04-25 DIAGNOSIS — L03.115 CELLULITIS OF RIGHT LOWER EXTREMITY: ICD-10-CM

## 2023-04-25 PROCEDURE — 11042 DBRDMT SUBQ TIS 1ST 20SQCM/<: CPT

## 2023-04-25 PROCEDURE — 99214 OFFICE O/P EST MOD 30 MIN: CPT | Performed by: PODIATRIST

## 2023-04-25 PROCEDURE — 11042 DBRDMT SUBQ TIS 1ST 20SQCM/<: CPT | Performed by: PODIATRIST

## 2023-04-25 RX ORDER — LIDOCAINE HYDROCHLORIDE 40 MG/ML
SOLUTION TOPICAL ONCE
OUTPATIENT
Start: 2023-04-25 | End: 2023-04-25

## 2023-04-25 RX ORDER — LIDOCAINE 40 MG/G
CREAM TOPICAL ONCE
OUTPATIENT
Start: 2023-04-25 | End: 2023-04-25

## 2023-04-25 RX ORDER — LIDOCAINE HYDROCHLORIDE 20 MG/ML
JELLY TOPICAL ONCE
OUTPATIENT
Start: 2023-04-25 | End: 2023-04-25

## 2023-04-25 RX ORDER — GENTAMICIN SULFATE 1 MG/G
OINTMENT TOPICAL ONCE
OUTPATIENT
Start: 2023-04-25 | End: 2023-04-25

## 2023-04-25 RX ORDER — BETAMETHASONE DIPROPIONATE 0.05 %
OINTMENT (GRAM) TOPICAL ONCE
OUTPATIENT
Start: 2023-04-25 | End: 2023-04-25

## 2023-04-25 RX ORDER — BACITRACIN ZINC AND POLYMYXIN B SULFATE 500; 1000 [USP'U]/G; [USP'U]/G
OINTMENT TOPICAL ONCE
OUTPATIENT
Start: 2023-04-25 | End: 2023-04-25

## 2023-04-25 RX ORDER — CLOBETASOL PROPIONATE 0.5 MG/G
OINTMENT TOPICAL ONCE
OUTPATIENT
Start: 2023-04-25 | End: 2023-04-25

## 2023-04-25 RX ORDER — LIDOCAINE 50 MG/G
OINTMENT TOPICAL ONCE
OUTPATIENT
Start: 2023-04-25 | End: 2023-04-25

## 2023-04-25 RX ORDER — BACITRACIN, NEOMYCIN, POLYMYXIN B 400; 3.5; 5 [USP'U]/G; MG/G; [USP'U]/G
OINTMENT TOPICAL ONCE
OUTPATIENT
Start: 2023-04-25 | End: 2023-04-25

## 2023-04-25 RX ORDER — LIDOCAINE HYDROCHLORIDE 40 MG/ML
SOLUTION TOPICAL ONCE
Status: COMPLETED | OUTPATIENT
Start: 2023-04-25 | End: 2023-04-25

## 2023-04-25 RX ORDER — GINSENG 100 MG
CAPSULE ORAL ONCE
OUTPATIENT
Start: 2023-04-25 | End: 2023-04-25

## 2023-04-25 RX ADMIN — LIDOCAINE HYDROCHLORIDE 5 ML: 40 SOLUTION TOPICAL at 11:46

## 2023-04-25 ASSESSMENT — PAIN DESCRIPTION - PAIN TYPE: TYPE: CHRONIC PAIN

## 2023-04-25 ASSESSMENT — PAIN DESCRIPTION - FREQUENCY: FREQUENCY: CONTINUOUS

## 2023-04-25 ASSESSMENT — PAIN DESCRIPTION - ORIENTATION: ORIENTATION: LEFT

## 2023-04-25 ASSESSMENT — PAIN DESCRIPTION - ONSET: ONSET: ON-GOING

## 2023-04-25 ASSESSMENT — PAIN SCALES - GENERAL: PAINLEVEL_OUTOF10: 7

## 2023-04-25 ASSESSMENT — PAIN - FUNCTIONAL ASSESSMENT: PAIN_FUNCTIONAL_ASSESSMENT: PREVENTS OR INTERFERES SOME ACTIVE ACTIVITIES AND ADLS

## 2023-04-25 ASSESSMENT — PAIN DESCRIPTION - LOCATION: LOCATION: LEG

## 2023-04-25 NOTE — PROGRESS NOTES
[employer] Application   Below Knee    NAME:  Santana Norris  YOB: 1965  MEDICAL RECORD NUMBER:  [id number]  DATE:  4/25/2023    [x] Removed old Anastasiya Deonte boot if indicated and wash leg with mild soap and water. [x] Applied moisturizing agent to dry skin as needed. [] Appied primary and secondary dressing as ordered    [x] Applied Unna roll from toes to knee overlapping each time. [x] Applied ace wrap or coban from toes to below the knee. [x] Secured with tape and/or metal clips covered with tape. [x] Instructed patient/caregiver to keep dressing dry and intact. DO NOT REMOVE DRESSING. [x] Instructed pt/family/caregiver to report excessive draining, loose bandage, wet dressing, severe pain or tingling in toes. [x] Applied [employer] dressing below the knee to Right lower leg(s)       Unna Boot(s) were applied per  Guidelines.      Electronically signed by Adela Leonardo RN on 4/25/2023 at 12:14 PM

## 2023-04-25 NOTE — PROGRESS NOTES
[address]  New Patient History and Physical      [employer]  AGE: 59 y.o.    GENDER: male  : 1965  TODAY'S DATE:  2023    Chief Complaint:   Chief Complaint   Patient presents with    Wound Check     Right lower leg         History of the Present Illness       Tyrone Whitaker is a 59 y.o. male who presents today for evaluation and treatment for a lymphedema wound which is located on the right    History of Wound: doing well with silvercel dressing, compression, culture was negative  Wound Type:lymphedema  Wound Location:right leg  Modifying factors:edema and decreased mobility    Pain Level: 7   Pain Assessment: 0-10     Abx :Yes   Cultures :wereobtained  Pain : 7/10    PAST MEDICAL HISTORY        Diagnosis Date    Bipolar disorder, unspecified ([address]     Depression     Edema     Essential hypertension, benign     Hypothyroid     Impetigo     Infantile cerebral palsy, unspecified     Other and unspecified hyperlipidemia     Schizophrenia, schizo-affective (HCC)     Shortness of breath     Unspecified hypothyroidism        MEDICATIONS    Current Outpatient Medications on File Prior to Encounter   Medication Sig Dispense Refill    levothyroxine (SYNTHROID) 50 MCG tablet  (Patient not taking: Reported on 2023)      clindamycin (CLEOCIN) 300 MG capsule Take 1 capsule by mouth 3 times daily for 10 days 30 capsule 0    levothyroxine (SYNTHROID) 75 MCG tablet TAKE 1 TABLET BY MOUTH EVERY DAY 90 tablet 1    lovastatin (MEVACOR) 20 MG tablet TAKE 1 TABLET BY MOUTH EVERY NIGHT 90 tablet 3    furosemide (LASIX) 20 MG tablet Take 1 tablet by mouth daily 90 tablet 3    benzonatate (TESSALON) 100 MG capsule TAKE 1 TO 2 CAPSULES BY MOUTH THREE TIMES DAILY AS NEEDED FOR COUGH (Patient not taking: Reported on 2023) 60 capsule 0    sertraline (ZOLOFT) 25 MG tablet       traZODone (DESYREL) 100 MG tablet   2    risperiDONE (RISPERDAL) 1 MG tablet       diphenhydrAMINE HCl, Sleep, 25 MG TBDP Take

## 2023-05-02 ENCOUNTER — HOSPITAL ENCOUNTER (OUTPATIENT)
Dept: WOUND CARE | Age: 58
Discharge: HOME | End: 2023-05-02
Payer: MEDICARE

## 2023-05-02 VITALS
HEART RATE: 67 BPM | WEIGHT: 165 LBS | DIASTOLIC BLOOD PRESSURE: 84 MMHG | SYSTOLIC BLOOD PRESSURE: 144 MMHG | BODY MASS INDEX: 31.15 KG/M2 | HEIGHT: 61 IN | TEMPERATURE: 96 F | RESPIRATION RATE: 18 BRPM

## 2023-05-02 DIAGNOSIS — G80.9 INFANTILE CEREBRAL PALSY (HCC): ICD-10-CM

## 2023-05-02 DIAGNOSIS — R60.0 LOCALIZED EDEMA: ICD-10-CM

## 2023-05-02 DIAGNOSIS — L97.812 NON-PRESSURE CHRONIC ULCER OF OTHER PART OF RIGHT LOWER LEG WITH FAT LAYER EXPOSED (HCC): Primary | ICD-10-CM

## 2023-05-02 PROCEDURE — 11042 DBRDMT SUBQ TIS 1ST 20SQCM/<: CPT | Performed by: PODIATRIST

## 2023-05-02 PROCEDURE — 11042 DBRDMT SUBQ TIS 1ST 20SQCM/<: CPT

## 2023-05-02 RX ORDER — LIDOCAINE HYDROCHLORIDE 20 MG/ML
JELLY TOPICAL ONCE
OUTPATIENT
Start: 2023-05-02 | End: 2023-05-02

## 2023-05-02 RX ORDER — LIDOCAINE 50 MG/G
OINTMENT TOPICAL ONCE
OUTPATIENT
Start: 2023-05-02 | End: 2023-05-02

## 2023-05-02 RX ORDER — BACITRACIN ZINC AND POLYMYXIN B SULFATE 500; 1000 [USP'U]/G; [USP'U]/G
OINTMENT TOPICAL ONCE
OUTPATIENT
Start: 2023-05-02 | End: 2023-05-02

## 2023-05-02 RX ORDER — LIDOCAINE HYDROCHLORIDE 40 MG/ML
SOLUTION TOPICAL ONCE
Status: COMPLETED | OUTPATIENT
Start: 2023-05-02 | End: 2023-05-02

## 2023-05-02 RX ORDER — BETAMETHASONE DIPROPIONATE 0.05 %
OINTMENT (GRAM) TOPICAL ONCE
OUTPATIENT
Start: 2023-05-02 | End: 2023-05-02

## 2023-05-02 RX ORDER — BACITRACIN, NEOMYCIN, POLYMYXIN B 400; 3.5; 5 [USP'U]/G; MG/G; [USP'U]/G
OINTMENT TOPICAL ONCE
OUTPATIENT
Start: 2023-05-02 | End: 2023-05-02

## 2023-05-02 RX ORDER — LIDOCAINE HYDROCHLORIDE 40 MG/ML
SOLUTION TOPICAL ONCE
OUTPATIENT
Start: 2023-05-02 | End: 2023-05-02

## 2023-05-02 RX ORDER — LIDOCAINE 40 MG/G
CREAM TOPICAL ONCE
OUTPATIENT
Start: 2023-05-02 | End: 2023-05-02

## 2023-05-02 RX ORDER — GENTAMICIN SULFATE 1 MG/G
OINTMENT TOPICAL ONCE
OUTPATIENT
Start: 2023-05-02 | End: 2023-05-02

## 2023-05-02 RX ORDER — CLOBETASOL PROPIONATE 0.5 MG/G
OINTMENT TOPICAL ONCE
OUTPATIENT
Start: 2023-05-02 | End: 2023-05-02

## 2023-05-02 RX ORDER — GINSENG 100 MG
CAPSULE ORAL ONCE
OUTPATIENT
Start: 2023-05-02 | End: 2023-05-02

## 2023-05-02 RX ADMIN — LIDOCAINE HYDROCHLORIDE 5 ML: 40 SOLUTION TOPICAL at 10:42

## 2023-05-02 ASSESSMENT — PAIN DESCRIPTION - LOCATION: LOCATION: LEG

## 2023-05-02 ASSESSMENT — PAIN DESCRIPTION - ONSET: ONSET: ON-GOING

## 2023-05-02 ASSESSMENT — PAIN DESCRIPTION - ORIENTATION: ORIENTATION: LEFT;LOWER

## 2023-05-02 ASSESSMENT — PAIN DESCRIPTION - FREQUENCY: FREQUENCY: CONTINUOUS

## 2023-05-02 ASSESSMENT — PAIN DESCRIPTION - PAIN TYPE: TYPE: CHRONIC PAIN

## 2023-05-02 ASSESSMENT — PAIN DESCRIPTION - DESCRIPTORS: DESCRIPTORS: SORE

## 2023-05-02 ASSESSMENT — PAIN SCALES - GENERAL: PAINLEVEL_OUTOF10: 5

## 2023-05-02 NOTE — DISCHARGE INSTRUCTIONS
[address]                                 Visit  Discharge Instructions / Physician Orders  DATE: 4/25/23     Home Care: none     SUPPLIES ORDERED THRU:   [facility]                  DATE LAST SUPPLIED  4/20/23 (did not receive as of 4/25/23)   If you have not heard from the dressing supply company within 2 -3 days of visit please call company to check on the status of your order. [facility]- [phone]     Wound Location:  Right lower leg     Cleanse with: Keep dry and intact     Dressing Orders:  Primary dressing    silvercel,  to wound, wrap the leg with zinc unna boot            Frequency:Keep dry and intact   wear slippers with socks around the house  Additional Orders: Increase protein to diet (meat, cheese, eggs, fish, peanut butter, nuts and beans)  Multivitamin daily     OFFLOADING [] YES  TYPE:                  [x] NA     Weekly wound care visits until determined otherwise. Antibiotic therapy-wound care related YES [] NO [] NA[]        MY CHART []     Smart Device  []                      [facility] in [facility] week                                                                                                   (Please note your next appointment above and if you are unable to keep, kindly give a 24 hour notice. Thank you.)  If more than 15 min late we cannot guarantee you will be seen due to clinician schedule  Per Policy, Excessive cancellation will call for dismissal from program.  If you experience any of the following, please call the [email or web address] during business hours:  [phone]     * Increase in Pain  * Temperature over 101  * Increase in drainage from your wound  * Drainage with a foul odor  * Bleeding  * Increase in swelling  * Need for compression bandage changes due to slippage, breakthrough drainage.      If you need medical attention outside of the business hours of

## 2023-05-02 NOTE — PROGRESS NOTES
[address]  Return Patient History and Physical      [employer]  AGE: 59 y.o.    GENDER: male  : 1965  TODAY'S DATE:  2023    Chief Complaint:   Chief Complaint   Patient presents with    Wound Check     Right lower leg         History of the Present Illness       Sharyn Crum is a 59 y.o. male who presents today for evaluation and treatment for a lymphedema wound which is located on the right    History of Wound: doing well with silvercel dressing, compression, culture was negative  Wound Type:lymphedema  Wound Location:right leg  Modifying factors:edema and decreased mobility    Pain Level: 5   Pain Assessment: 0-10     Abx :Yes   Cultures :wereobtained  Pain : 7/10    PAST MEDICAL HISTORY        Diagnosis Date    Bipolar disorder, unspecified ([address]     Depression     Edema     Essential hypertension, benign     Hypothyroid     Impetigo     Infantile cerebral palsy, unspecified     Other and unspecified hyperlipidemia     Schizophrenia, schizo-affective (HCC)     Shortness of breath     Unspecified hypothyroidism        MEDICATIONS    Current Outpatient Medications on File Prior to Encounter   Medication Sig Dispense Refill    levothyroxine (SYNTHROID) 50 MCG tablet  (Patient not taking: Reported on 2023)      levothyroxine (SYNTHROID) 75 MCG tablet TAKE 1 TABLET BY MOUTH EVERY DAY 90 tablet 1    lovastatin (MEVACOR) 20 MG tablet TAKE 1 TABLET BY MOUTH EVERY NIGHT 90 tablet 3    furosemide (LASIX) 20 MG tablet Take 1 tablet by mouth daily 90 tablet 3    benzonatate (TESSALON) 100 MG capsule TAKE 1 TO 2 CAPSULES BY MOUTH THREE TIMES DAILY AS NEEDED FOR COUGH (Patient not taking: Reported on 2023) 60 capsule 0    sertraline (ZOLOFT) 25 MG tablet       traZODone (DESYREL) 100 MG tablet   2    risperiDONE (RISPERDAL) 1 MG tablet       diphenhydrAMINE HCl, Sleep, 25 MG TBDP Take 50 mg by mouth nightly as needed OTC (Patient not taking: Reported on 2023)       No current
[employer] Application   Below Knee    NAME:  Trixie Celis  YOB: 1965  MEDICAL RECORD NUMBER:  [id number]  DATE:  5/2/2023    [x] Applied moisturizing agent to dry skin as needed. [x] Appied primary and secondary dressing as ordered    [x] Applied Unna roll from toes to knee overlapping each time. [x] Applied ace wrap or coban from toes to below the knee. [x] Secured with tape and/or metal clips covered with tape. [x] Instructed patient/caregiver to keep dressing dry and intact. DO NOT REMOVE DRESSING. [x] Instructed pt/family/caregiver to report excessive draining, loose bandage, wet dressing, severe pain or tingling in toes. [x] Applied [employer] dressing below the knee to Right lower leg(s)       Unna Boot(s) were applied per  Guidelines.      Electronically signed by Kiley Phelps RN on 5/2/2023 at 11:27 AM
otherwise noted below. Documentation of the HPI, Physical Exam and Medical Decision Making performed by medical students or scribes is based on my personal performance of the HPI, PE and MDM. I have personally evaluated this patient and have completed at least one if not all key elements of the E/M (history, physical exam, and MDM). Additional findings are as noted.      Satya Brock DPM on 5/2/2023 at 46:12 AM  Board Certified, American Board of Podiatric Surgery  Fellow, [employer] [facility] [employer] and [facility]

## 2023-05-09 ENCOUNTER — HOSPITAL ENCOUNTER (OUTPATIENT)
Dept: WOUND CARE | Age: 58
Discharge: HOME | End: 2023-05-09
Payer: MEDICARE

## 2023-05-09 VITALS
HEIGHT: 61 IN | WEIGHT: 165 LBS | BODY MASS INDEX: 31.15 KG/M2 | HEART RATE: 61 BPM | DIASTOLIC BLOOD PRESSURE: 74 MMHG | TEMPERATURE: 97.8 F | RESPIRATION RATE: 18 BRPM | SYSTOLIC BLOOD PRESSURE: 124 MMHG

## 2023-05-09 DIAGNOSIS — L97.812 NON-PRESSURE CHRONIC ULCER OF OTHER PART OF RIGHT LOWER LEG WITH FAT LAYER EXPOSED (HCC): Primary | ICD-10-CM

## 2023-05-09 DIAGNOSIS — R60.0 LOCALIZED EDEMA: ICD-10-CM

## 2023-05-09 DIAGNOSIS — I10 ESSENTIAL HYPERTENSION, BENIGN: ICD-10-CM

## 2023-05-09 DIAGNOSIS — G80.9 INFANTILE CEREBRAL PALSY (HCC): ICD-10-CM

## 2023-05-09 PROCEDURE — 99213 OFFICE O/P EST LOW 20 MIN: CPT

## 2023-05-09 PROCEDURE — 99213 OFFICE O/P EST LOW 20 MIN: CPT | Performed by: PODIATRIST

## 2023-05-09 RX ORDER — LIDOCAINE 50 MG/G
OINTMENT TOPICAL ONCE
OUTPATIENT
Start: 2023-05-09 | End: 2023-05-09

## 2023-05-09 RX ORDER — BETAMETHASONE DIPROPIONATE 0.05 %
OINTMENT (GRAM) TOPICAL ONCE
OUTPATIENT
Start: 2023-05-09 | End: 2023-05-09

## 2023-05-09 RX ORDER — LIDOCAINE HYDROCHLORIDE 40 MG/ML
SOLUTION TOPICAL ONCE
Status: DISCONTINUED | OUTPATIENT
Start: 2023-05-09 | End: 2023-05-10 | Stop reason: HOSPADM

## 2023-05-09 RX ORDER — LIDOCAINE 40 MG/G
CREAM TOPICAL ONCE
OUTPATIENT
Start: 2023-05-09 | End: 2023-05-09

## 2023-05-09 RX ORDER — FUROSEMIDE 20 MG/1
20 TABLET ORAL DAILY
Qty: 90 TABLET | Refills: 3 | Status: SHIPPED | OUTPATIENT
Start: 2023-05-09

## 2023-05-09 RX ORDER — CLOBETASOL PROPIONATE 0.5 MG/G
OINTMENT TOPICAL ONCE
OUTPATIENT
Start: 2023-05-09 | End: 2023-05-09

## 2023-05-09 RX ORDER — BACITRACIN ZINC AND POLYMYXIN B SULFATE 500; 1000 [USP'U]/G; [USP'U]/G
OINTMENT TOPICAL ONCE
OUTPATIENT
Start: 2023-05-09 | End: 2023-05-09

## 2023-05-09 RX ORDER — GINSENG 100 MG
CAPSULE ORAL ONCE
OUTPATIENT
Start: 2023-05-09 | End: 2023-05-09

## 2023-05-09 RX ORDER — LIDOCAINE HYDROCHLORIDE 20 MG/ML
JELLY TOPICAL ONCE
OUTPATIENT
Start: 2023-05-09 | End: 2023-05-09

## 2023-05-09 RX ORDER — LIDOCAINE HYDROCHLORIDE 40 MG/ML
SOLUTION TOPICAL ONCE
OUTPATIENT
Start: 2023-05-09 | End: 2023-05-09

## 2023-05-09 RX ORDER — GENTAMICIN SULFATE 1 MG/G
OINTMENT TOPICAL ONCE
OUTPATIENT
Start: 2023-05-09 | End: 2023-05-09

## 2023-05-09 RX ORDER — BACITRACIN, NEOMYCIN, POLYMYXIN B 400; 3.5; 5 [USP'U]/G; MG/G; [USP'U]/G
OINTMENT TOPICAL ONCE
OUTPATIENT
Start: 2023-05-09 | End: 2023-05-09

## 2023-05-09 ASSESSMENT — PAIN SCALES - GENERAL: PAINLEVEL_OUTOF10: 5

## 2023-05-09 NOTE — PROGRESS NOTES
[address]  Return Patient History and Physical      [employer]  AGE: 59 y.o. GENDER: male  : 1965  TODAY'S DATE:  2023    Chief Complaint:   Chief Complaint   Patient presents with    Wound Check     Right lower leg         History of the Present Illness       Royal Carty is a 59 y.o. male who presents today for evaluation and treatment for a lymphedema wound which is located on the right    History of Wound: doing well with silvercel dressing, compression, culture was negative  Wound Type:lymphedema  Wound Location:right leg  Modifying factors:edema and decreased mobility    Pain Level: 5 (wounds)   Pain Assessment: 0-10     Abx :Yes   Cultures :wereobtained  Pain : 7/10    PAST MEDICAL HISTORY        Diagnosis Date    Bipolar disorder, unspecified ([address]     Depression     Edema     Essential hypertension, benign     Hypothyroid     Impetigo     Infantile cerebral palsy, unspecified     Other and unspecified hyperlipidemia     Schizophrenia, schizo-affective (HCC)     Shortness of breath     Unspecified hypothyroidism        MEDICATIONS    Current Outpatient Medications on File Prior to Encounter   Medication Sig Dispense Refill    levothyroxine (SYNTHROID) 50 MCG tablet  (Patient not taking: Reported on 2023)      levothyroxine (SYNTHROID) 75 MCG tablet TAKE 1 TABLET BY MOUTH EVERY DAY 90 tablet 1    lovastatin (MEVACOR) 20 MG tablet TAKE 1 TABLET BY MOUTH EVERY NIGHT 90 tablet 3    benzonatate (TESSALON) 100 MG capsule TAKE 1 TO 2 CAPSULES BY MOUTH THREE TIMES DAILY AS NEEDED FOR COUGH (Patient not taking: Reported on 2023) 60 capsule 0    sertraline (ZOLOFT) 25 MG tablet       traZODone (DESYREL) 100 MG tablet   2    risperiDONE (RISPERDAL) 1 MG tablet       diphenhydrAMINE HCl, Sleep, 25 MG TBDP Take 50 mg by mouth nightly as needed OTC (Patient not taking: Reported on 2023)       No current facility-administered medications on file prior to encounter.

## 2023-05-09 NOTE — PLAN OF CARE
Problem: Discharge Planning  Goal: Discharge to home or other facility with appropriate resources  Outcome: Completed     Problem: Pain  Goal: Verbalizes/displays adequate comfort level or baseline comfort level  Outcome: Completed     Problem: Wound:  Goal: Will show signs of wound healing; wound closure and no evidence of infection  Description: Will show signs of wound healing; wound closure and no evidence of infection  Outcome: Completed     Problem: Venous:  Goal: Signs of wound healing will improve  Description: Signs of wound healing will improve  Outcome: Completed

## 2023-06-27 ENCOUNTER — OFFICE VISIT (OUTPATIENT)
Dept: INTERNAL MEDICINE CLINIC | Age: 58
End: 2023-06-27
Payer: MEDICARE

## 2023-06-27 VITALS
HEIGHT: 61 IN | HEART RATE: 64 BPM | OXYGEN SATURATION: 97 % | WEIGHT: 158 LBS | SYSTOLIC BLOOD PRESSURE: 132 MMHG | DIASTOLIC BLOOD PRESSURE: 78 MMHG | BODY MASS INDEX: 29.83 KG/M2

## 2023-06-27 DIAGNOSIS — E03.9 ACQUIRED HYPOTHYROIDISM: ICD-10-CM

## 2023-06-27 DIAGNOSIS — G80.9 INFANTILE CEREBRAL PALSY (HCC): Primary | ICD-10-CM

## 2023-06-27 DIAGNOSIS — E78.2 MIXED HYPERLIPIDEMIA: ICD-10-CM

## 2023-06-27 DIAGNOSIS — I10 ESSENTIAL HYPERTENSION, BENIGN: ICD-10-CM

## 2023-06-27 DIAGNOSIS — L03.115 CELLULITIS OF RIGHT LOWER EXTREMITY: ICD-10-CM

## 2023-06-27 DIAGNOSIS — F20.0 PARANOID SCHIZOPHRENIA (HCC): ICD-10-CM

## 2023-06-27 DIAGNOSIS — L97.812 NON-PRESSURE CHRONIC ULCER OF OTHER PART OF RIGHT LOWER LEG WITH FAT LAYER EXPOSED (HCC): ICD-10-CM

## 2023-06-27 PROBLEM — K59.1 FUNCTIONAL DIARRHEA: Status: RESOLVED | Noted: 2018-09-04 | Resolved: 2023-06-27

## 2023-06-27 PROCEDURE — 3017F COLORECTAL CA SCREEN DOC REV: CPT | Performed by: INTERNAL MEDICINE

## 2023-06-27 PROCEDURE — G8427 DOCREV CUR MEDS BY ELIG CLIN: HCPCS | Performed by: INTERNAL MEDICINE

## 2023-06-27 PROCEDURE — 3075F SYST BP GE 130 - 139MM HG: CPT | Performed by: INTERNAL MEDICINE

## 2023-06-27 PROCEDURE — 3078F DIAST BP <80 MM HG: CPT | Performed by: INTERNAL MEDICINE

## 2023-06-27 PROCEDURE — 99213 OFFICE O/P EST LOW 20 MIN: CPT | Performed by: INTERNAL MEDICINE

## 2023-06-27 PROCEDURE — G8417 CALC BMI ABV UP PARAM F/U: HCPCS | Performed by: INTERNAL MEDICINE

## 2023-06-27 PROCEDURE — 1036F TOBACCO NON-USER: CPT | Performed by: INTERNAL MEDICINE

## 2023-06-27 ASSESSMENT — ENCOUNTER SYMPTOMS
SHORTNESS OF BREATH: 0
DIARRHEA: 0

## 2023-07-15 DIAGNOSIS — E03.9 ACQUIRED HYPOTHYROIDISM: Primary | ICD-10-CM

## 2023-07-17 RX ORDER — LEVOTHYROXINE SODIUM 0.05 MG/1
TABLET ORAL
Qty: 90 TABLET | Refills: 3 | Status: SHIPPED | OUTPATIENT
Start: 2023-07-17

## 2023-10-12 DIAGNOSIS — I10 ESSENTIAL HYPERTENSION, BENIGN: ICD-10-CM

## 2023-10-12 RX ORDER — LEVOTHYROXINE SODIUM 0.07 MG/1
TABLET ORAL
Qty: 90 TABLET | Refills: 1 | Status: SHIPPED | OUTPATIENT
Start: 2023-10-12

## 2024-01-12 ENCOUNTER — TELEPHONE (OUTPATIENT)
Dept: INTERNAL MEDICINE CLINIC | Age: 59
End: 2024-01-12

## 2024-01-12 NOTE — TELEPHONE ENCOUNTER
----- Message from Delmy Stuart sent at 1/12/2024 11:28 AM EST -----  Subject: Appointment Request    Reason for Call: Established Patient Appointment needed: Routine Physical   Exam    QUESTIONS    Reason for appointment request? No appointments available during search     Additional Information for Provider? Pt would like AWV   ---------------------------------------------------------------------------  --------------  CALL BACK INFO  [phone]; OK to leave message on voicemail  ---------------------------------------------------------------------------  --------------  SCRIPT ANSWERS

## 2024-02-28 ENCOUNTER — APPOINTMENT (OUTPATIENT)
Dept: GENERAL RADIOLOGY | Age: 59
End: 2024-02-28
Payer: MEDICARE

## 2024-02-28 ENCOUNTER — HOSPITAL ENCOUNTER (EMERGENCY)
Age: 59
Discharge: HOME | End: 2024-02-28
Attending: EMERGENCY MEDICINE
Payer: MEDICARE

## 2024-02-28 ENCOUNTER — APPOINTMENT (OUTPATIENT)
Dept: CT IMAGING | Age: 59
End: 2024-02-28
Payer: MEDICARE

## 2024-02-28 VITALS
TEMPERATURE: 97.9 F | HEART RATE: 59 BPM | SYSTOLIC BLOOD PRESSURE: 129 MMHG | BODY MASS INDEX: 31.55 KG/M2 | WEIGHT: 167 LBS | OXYGEN SATURATION: 95 % | DIASTOLIC BLOOD PRESSURE: 80 MMHG | RESPIRATION RATE: 16 BRPM
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
## 2024-02-28 DIAGNOSIS — S49.91XA INJURY OF RIGHT SHOULDER, INITIAL ENCOUNTER: ICD-10-CM

## 2024-02-28 PROCEDURE — 70450 CT HEAD/BRAIN W/O DYE: CPT

## 2024-02-28 PROCEDURE — 6360000002 HC RX W HCPCS: Performed by: EMERGENCY MEDICINE

## 2024-02-28 PROCEDURE — 72125 CT NECK SPINE W/O DYE: CPT

## 2024-02-28 PROCEDURE — 99284 EMERGENCY DEPT VISIT MOD MDM: CPT

## 2024-02-28 PROCEDURE — 6370000000 HC RX 637 (ALT 250 FOR IP): Performed by: EMERGENCY MEDICINE

## 2024-02-28 PROCEDURE — 96372 THER/PROPH/DIAG INJ SC/IM: CPT

## 2024-02-28 PROCEDURE — 73030 X-RAY EXAM OF SHOULDER: CPT

## 2024-02-28 RX ORDER — KETOROLAC TROMETHAMINE 30 MG/ML
30 INJECTION, SOLUTION INTRAMUSCULAR; INTRAVENOUS ONCE
Status: COMPLETED | OUTPATIENT
Start: 2024-02-28 | End: 2024-02-28

## 2024-02-28 RX ORDER — ACETAMINOPHEN 500 MG
1000 TABLET ORAL ONCE
Status: COMPLETED | OUTPATIENT
Start: 2024-02-28 | End: 2024-02-28

## 2024-02-28 RX ADMIN — KETOROLAC TROMETHAMINE 30 MG: 30 INJECTION INTRAMUSCULAR; INTRAVENOUS at 18:27

## 2024-02-28 RX ADMIN — ACETAMINOPHEN 1000 MG: 500 TABLET ORAL at 17:00

## 2024-02-28 ASSESSMENT — PAIN DESCRIPTION - DIRECTION: RADIATING_TOWARDS: SHOULDER PAIN

## 2024-02-28 ASSESSMENT — PAIN DESCRIPTION - DESCRIPTORS: DESCRIPTORS: ACHING

## 2024-02-28 ASSESSMENT — ENCOUNTER SYMPTOMS
EYE PAIN: 0
COLOR CHANGE: 0
BACK PAIN: 0
SHORTNESS OF BREATH: 0
ABDOMINAL PAIN: 0

## 2024-02-28 ASSESSMENT — PAIN SCALES - GENERAL
PAINLEVEL_OUTOF10: 9
PAINLEVEL_OUTOF10: 7
PAINLEVEL_OUTOF10: 9

## 2024-02-28 ASSESSMENT — PAIN DESCRIPTION - LOCATION
LOCATION: SHOULDER
LOCATION: SHOULDER

## 2024-02-28 ASSESSMENT — PAIN DESCRIPTION - ORIENTATION: ORIENTATION: RIGHT

## 2024-02-28 NOTE — ED TRIAGE NOTES
Pt's sister states yesterday he was walking into his room with crutches when he tripped and fell. States he did hit his head on forehead area, -LOC, no blood thinners. Also reports falling onto right shoulder, now having limited movement and pain

## 2024-02-28 NOTE — ED PROVIDER NOTES
EMERGENCY DEPARTMENT ENCOUNTER    Pt Name: Rolando Kern  MRN: [id number]  Birthdate 1965  Date of evaluation: 2/28/24  CHIEF COMPLAINT       Chief Complaint   Patient presents with    Fall     HISTORY OF PRESENT ILLNESS   58-year-old male presents for fall.  Patient has a history of cerebral palsy, ambulates with crutches, reportedly tripped yesterday, and fell onto his right shoulder did hit his head no loss of consciousness per patient or family, sister reports that he has been at his baseline but complaining of right shoulder pain and brought him for evaluation.  Patient denies any current headache or neck pain denies any back pain denies any new numbness tingling or weakness to extremities, complaining of right shoulder pain worse with movements.  Denies chest pain, shortness of breath, dizziness or lightheadedness    The history is provided by the patient.           REVIEW OF SYSTEMS     Review of Systems   Constitutional:  Negative for chills and fever.   HENT:  Negative for congestion and ear pain.    Eyes:  Negative for pain.   Respiratory:  Negative for shortness of breath.    Cardiovascular:  Negative for chest pain, palpitations and leg swelling.   Gastrointestinal:  Negative for abdominal pain.   Genitourinary:  Negative for dysuria and flank pain.   Musculoskeletal:  Negative for back pain.        Shoulder Pain   Skin:  Negative for color change.   Neurological:  Negative for numbness and headaches.   Psychiatric/Behavioral:  Negative for confusion.    All other systems reviewed and are negative.    PASTMEDICAL HISTORY     Past Medical History:   Diagnosis Date    Bipolar disorder, unspecified (HCC)     Depression     Edema     Essential hypertension, benign     Hypothyroid     Impetigo     Infantile cerebral palsy, unspecified     Other and unspecified hyperlipidemia     Schizophrenia, schizo-affective (HCC)     Shortness of breath     Unspecified hypothyroidism      Past Problem List  Patient

## 2024-03-29 ENCOUNTER — HOSPITAL ENCOUNTER (OUTPATIENT)
Age: 59
Discharge: HOME | End: 2024-03-29

## 2024-03-29 LAB
ALBUMIN SERPL-MCNC: 4.4 G/DL (ref 3.5–5.2)
ALP SERPL-CCNC: 70 U/L (ref 40–129)
ALT SERPL-CCNC: 27 U/L (ref 5–41)
ANION GAP SERPL CALCULATED.3IONS-SCNC: 16 MMOL/L (ref 9–17)
AST SERPL-CCNC: 20 U/L
BILIRUB SERPL-MCNC: 0.7 MG/DL (ref 0.3–1.2)
BUN SERPL-MCNC: 18 MG/DL (ref 8–23)
CALCIUM SERPL-MCNC: 9 MG/DL (ref 8.6–10.4)
CHLORIDE SERPL-SCNC: 104 MMOL/L (ref 98–107)
CHOLEST SERPL-MCNC: 202 MG/DL
CHOLESTEROL/HDL RATIO: 5.8
CO2 SERPL-SCNC: 22 MMOL/L (ref 20–31)
CREAT SERPL-MCNC: 0.9 MG/DL (ref 0.7–1.2)
EST. AVERAGE GLUCOSE BLD GHB EST-MCNC: 128 MG/DL
GFR SERPL CREATININE-BSD FRML MDRD: >90 ML/MIN/1.73M2
GLUCOSE SERPL-MCNC: 139 MG/DL (ref 70–99)
HBA1C MFR BLD: 6.1 % (ref 4–6)
HDLC SERPL-MCNC: 35 MG/DL
LDLC SERPL CALC-MCNC: 87 MG/DL (ref 0–130)
POTASSIUM SERPL-SCNC: 3.8 MMOL/L (ref 3.7–5.3)
PROT SERPL-MCNC: 7.5 G/DL (ref 6.4–8.3)
SODIUM SERPL-SCNC: 142 MMOL/L (ref 135–144)
TRIGL SERPL-MCNC: 398 MG/DL

## 2024-03-29 PROCEDURE — 80061 LIPID PANEL: CPT

## 2024-03-29 PROCEDURE — 36415 COLL VENOUS BLD VENIPUNCTURE: CPT

## 2024-03-29 PROCEDURE — 83036 HEMOGLOBIN GLYCOSYLATED A1C: CPT

## 2024-03-29 PROCEDURE — 80053 COMPREHEN METABOLIC PANEL: CPT

## 2024-04-13 DIAGNOSIS — I10 ESSENTIAL HYPERTENSION, BENIGN: ICD-10-CM

## 2024-04-15 RX ORDER — LEVOTHYROXINE SODIUM 0.07 MG/1
TABLET ORAL
Qty: 90 TABLET | Refills: 1 | OUTPATIENT
Start: 2024-04-15

## 2024-04-26 SDOH — HEALTH STABILITY: PHYSICAL HEALTH: ON AVERAGE, HOW MANY DAYS PER WEEK DO YOU ENGAGE IN MODERATE TO STRENUOUS EXERCISE (LIKE A BRISK WALK)?: 0 DAYS

## 2024-04-29 ENCOUNTER — OFFICE VISIT (OUTPATIENT)
Dept: PRIMARY CARE CLINIC | Age: 59
End: 2024-04-29
Payer: MEDICARE

## 2024-04-29 ENCOUNTER — HOSPITAL ENCOUNTER (OUTPATIENT)
Age: 59
Setting detail: SPECIMEN
Discharge: HOME | End: 2024-04-29

## 2024-04-29 VITALS
OXYGEN SATURATION: 95 % | BODY MASS INDEX: 31.55 KG/M2 | SYSTOLIC BLOOD PRESSURE: 120 MMHG | RESPIRATION RATE: 16 BRPM | DIASTOLIC BLOOD PRESSURE: 70 MMHG | HEIGHT: 61 IN | HEART RATE: 83 BPM

## 2024-04-29 DIAGNOSIS — F20.0 PARANOID SCHIZOPHRENIA (HCC): Primary | ICD-10-CM

## 2024-04-29 DIAGNOSIS — Z12.11 SCREEN FOR COLON CANCER: ICD-10-CM

## 2024-04-29 DIAGNOSIS — G80.9 INFANTILE CEREBRAL PALSY (HCC): ICD-10-CM

## 2024-04-29 DIAGNOSIS — Z23 NEED FOR TDAP VACCINATION: ICD-10-CM

## 2024-04-29 DIAGNOSIS — E03.9 ACQUIRED HYPOTHYROIDISM: ICD-10-CM

## 2024-04-29 DIAGNOSIS — E78.2 MIXED HYPERLIPIDEMIA: ICD-10-CM

## 2024-04-29 DIAGNOSIS — R73.03 PREDIABETES: ICD-10-CM

## 2024-04-29 LAB
T4 FREE SERPL-MCNC: 1.1 NG/DL (ref 0.92–1.68)
TSH SERPL DL<=0.05 MIU/L-ACNC: 4.16 UIU/ML (ref 0.27–4.2)

## 2024-04-29 PROCEDURE — 99204 OFFICE O/P NEW MOD 45 MIN: CPT | Performed by: PHYSICIAN ASSISTANT

## 2024-04-29 PROCEDURE — 3074F SYST BP LT 130 MM HG: CPT | Performed by: PHYSICIAN ASSISTANT

## 2024-04-29 PROCEDURE — 90715 TDAP VACCINE 7 YRS/> IM: CPT | Performed by: PHYSICIAN ASSISTANT

## 2024-04-29 PROCEDURE — 3078F DIAST BP <80 MM HG: CPT | Performed by: PHYSICIAN ASSISTANT

## 2024-04-29 PROCEDURE — 90471 IMMUNIZATION ADMIN: CPT | Performed by: PHYSICIAN ASSISTANT

## 2024-04-29 RX ORDER — LOVASTATIN 40 MG/1
40 TABLET ORAL NIGHTLY
Qty: 90 TABLET | Refills: 1 | Status: SHIPPED | OUTPATIENT
Start: 2024-04-29

## 2024-04-29 SDOH — ECONOMIC STABILITY: FOOD INSECURITY: WITHIN THE PAST 12 MONTHS, THE FOOD YOU BOUGHT JUST DIDN'T LAST AND YOU DIDN'T HAVE MONEY TO GET MORE.: NEVER TRUE

## 2024-04-29 SDOH — ECONOMIC STABILITY: FOOD INSECURITY: WITHIN THE PAST 12 MONTHS, YOU WORRIED THAT YOUR FOOD WOULD RUN OUT BEFORE YOU GOT MONEY TO BUY MORE.: NEVER TRUE

## 2024-04-29 SDOH — ECONOMIC STABILITY: INCOME INSECURITY: HOW HARD IS IT FOR YOU TO PAY FOR THE VERY BASICS LIKE FOOD, HOUSING, MEDICAL CARE, AND HEATING?: NOT HARD AT ALL

## 2024-04-29 ASSESSMENT — PATIENT HEALTH QUESTIONNAIRE - PHQ9
SUM OF ALL RESPONSES TO PHQ QUESTIONS 1-9: 0
2. FEELING DOWN, DEPRESSED OR HOPELESS: NOT AT ALL
1. LITTLE INTEREST OR PLEASURE IN DOING THINGS: NOT AT ALL
SUM OF ALL RESPONSES TO PHQ QUESTIONS 1-9: 0
SUM OF ALL RESPONSES TO PHQ9 QUESTIONS 1 & 2: 0

## 2024-04-30 ENCOUNTER — PATIENT MESSAGE (OUTPATIENT)
Dept: PRIMARY CARE CLINIC | Age: 59
End: 2024-04-30

## 2024-04-30 DIAGNOSIS — I10 ESSENTIAL HYPERTENSION, BENIGN: ICD-10-CM

## 2024-04-30 RX ORDER — LEVOTHYROXINE SODIUM 0.07 MG/1
75 TABLET ORAL DAILY
Qty: 90 TABLET | Refills: 1 | Status: SHIPPED | OUTPATIENT
Start: 2024-04-30

## 2024-04-30 NOTE — TELEPHONE ENCOUNTER
From: Rolando Kern  To: Mike Mtz  Sent: 4/30/2024 8:45 AM EDT  Subject: Thyroid     Ammy Mckeon just making sure i read the comment to [facility] thyroid test. That he continues with the current dose of 20 and he is not getting the higher dose of 40. I believe he was almost out of the 20's so he will need a refill for that today if you can have this done. Thank you Tonia .

## 2024-05-07 ASSESSMENT — ENCOUNTER SYMPTOMS
DIARRHEA: 0
CONSTIPATION: 0
SINUS PAIN: 0
BACK PAIN: 0
SHORTNESS OF BREATH: 0
EYE PAIN: 0
COUGH: 0
NAUSEA: 0
ABDOMINAL PAIN: 0
RHINORRHEA: 0
VOMITING: 0

## 2024-05-25 LAB — NONINV COLON CA DNA+OCC BLD SCRN STL QL: POSITIVE

## 2024-05-27 DIAGNOSIS — R19.5 POSITIVE COLORECTAL CANCER SCREENING USING COLOGUARD TEST: Primary | ICD-10-CM

## 2024-05-28 ENCOUNTER — TELEPHONE (OUTPATIENT)
Dept: SURGERY | Age: 59
End: 2024-05-28

## 2024-06-11 ENCOUNTER — TELEPHONE (OUTPATIENT)
Dept: SURGERY | Age: 59
End: 2024-06-11

## 2024-06-11 DIAGNOSIS — Z12.11 SCREENING FOR MALIGNANT NEOPLASM OF COLON: Primary | ICD-10-CM

## 2024-06-11 RX ORDER — SODIUM, POTASSIUM,MAG SULFATES 17.5-3.13G
SOLUTION, RECONSTITUTED, ORAL ORAL
Qty: 1 EACH | Refills: 0 | Status: SHIPPED | OUTPATIENT
Start: 2024-06-11

## 2024-06-11 NOTE — TELEPHONE ENCOUNTER
Patient scheduled for colonoscopy on 6/28/24 at 9:15am ,  please sign the pended order for prep.

## 2024-06-11 NOTE — TELEPHONE ENCOUNTER
Spoke to patient, surgery scheduled at [place]. Patient confirmed and information my charted to patient(s) 6/11/24.    Surgery date/time: 6/28/24 @ 9:15am  Arrival time: 7:45am  PAT: 6/18/24 @ 2:00pm    Diagnostic colonoscopy positive cologard  Medical clearance JULIO Robles pt has cerebral palsy.

## 2024-06-17 NOTE — PLAN OF CARE
Problem: Discharge Planning  Goal: Discharge to home or other facility with appropriate resources  Outcome: Progressing     Problem: Pain  Goal: Verbalizes/displays adequate comfort level or baseline comfort level  Outcome: Progressing     Problem: Wound:  Goal: Will show signs of wound healing; wound closure and no evidence of infection  Description: Will show signs of wound healing; wound closure and no evidence of infection  Outcome: Progressing     Problem: Venous:  Goal: Signs of wound healing will improve  Description: Signs of wound healing will improve  Outcome: Progressing     Problem: Compression therapy:  Goal: Will be free from complications associated with compression therapy  Description: Will be free from complications associated with compression therapy  Outcome: Progressing     Problem: Falls - Risk of:  Goal: Will remain free from falls  Description: Will remain free from falls  Outcome: Progressing 

## 2024-07-16 DIAGNOSIS — I10 ESSENTIAL HYPERTENSION, BENIGN: ICD-10-CM

## 2024-07-16 RX ORDER — FUROSEMIDE 20 MG/1
20 TABLET ORAL DAILY
Qty: 90 TABLET | Refills: 3 | Status: SHIPPED | OUTPATIENT
Start: 2024-07-16

## 2024-10-27 SDOH — HEALTH STABILITY: PHYSICAL HEALTH: ON AVERAGE, HOW MANY MINUTES DO YOU ENGAGE IN EXERCISE AT THIS LEVEL?: 20 MIN

## 2024-10-27 SDOH — HEALTH STABILITY: PHYSICAL HEALTH: ON AVERAGE, HOW MANY DAYS PER WEEK DO YOU ENGAGE IN MODERATE TO STRENUOUS EXERCISE (LIKE A BRISK WALK)?: 3 DAYS

## 2024-10-27 ASSESSMENT — PATIENT HEALTH QUESTIONNAIRE - PHQ9
SUM OF ALL RESPONSES TO PHQ QUESTIONS 1-9: 0
SUM OF ALL RESPONSES TO PHQ9 QUESTIONS 1 & 2: 0
2. FEELING DOWN, DEPRESSED OR HOPELESS: NOT AT ALL
1. LITTLE INTEREST OR PLEASURE IN DOING THINGS: NOT AT ALL

## 2024-10-27 ASSESSMENT — LIFESTYLE VARIABLES
HOW OFTEN DO YOU HAVE SIX OR MORE DRINKS ON ONE OCCASION: 1
HOW OFTEN DO YOU HAVE A DRINK CONTAINING ALCOHOL: 1
HOW MANY STANDARD DRINKS CONTAINING ALCOHOL DO YOU HAVE ON A TYPICAL DAY: 0
HOW MANY STANDARD DRINKS CONTAINING ALCOHOL DO YOU HAVE ON A TYPICAL DAY: PATIENT DOES NOT DRINK
HOW OFTEN DO YOU HAVE A DRINK CONTAINING ALCOHOL: NEVER

## 2024-10-30 ENCOUNTER — OFFICE VISIT (OUTPATIENT)
Dept: PRIMARY CARE CLINIC | Age: 59
End: 2024-10-30

## 2024-10-30 VITALS
RESPIRATION RATE: 16 BRPM | BODY MASS INDEX: 31.53 KG/M2 | OXYGEN SATURATION: 98 % | WEIGHT: 167 LBS | SYSTOLIC BLOOD PRESSURE: 116 MMHG | DIASTOLIC BLOOD PRESSURE: 82 MMHG | HEIGHT: 61 IN | HEART RATE: 58 BPM

## 2024-10-30 DIAGNOSIS — E78.2 MIXED HYPERLIPIDEMIA: Primary | ICD-10-CM

## 2024-10-30 DIAGNOSIS — Z23 NEED FOR INFLUENZA VACCINATION: ICD-10-CM

## 2024-10-30 DIAGNOSIS — E03.9 HYPOTHYROIDISM, UNSPECIFIED TYPE: ICD-10-CM

## 2024-10-30 DIAGNOSIS — I10 ESSENTIAL HYPERTENSION, BENIGN: ICD-10-CM

## 2024-10-30 DIAGNOSIS — Z00.00 MEDICARE ANNUAL WELLNESS VISIT, SUBSEQUENT: ICD-10-CM

## 2024-10-30 RX ORDER — RISPERIDONE 0.5 MG/1
0.5 TABLET ORAL NIGHTLY
COMMUNITY
Start: 2024-10-20

## 2024-10-30 RX ORDER — LOVASTATIN 40 MG/1
40 TABLET ORAL NIGHTLY
Qty: 90 TABLET | Refills: 1 | Status: SHIPPED | OUTPATIENT
Start: 2024-10-30

## 2024-10-30 RX ORDER — FUROSEMIDE 20 MG/1
20 TABLET ORAL DAILY
Qty: 90 TABLET | Refills: 3 | Status: SHIPPED | OUTPATIENT
Start: 2024-10-30

## 2024-10-30 RX ORDER — LEVOTHYROXINE SODIUM 75 UG/1
75 TABLET ORAL DAILY
Qty: 90 TABLET | Refills: 1 | Status: SHIPPED | OUTPATIENT
Start: 2024-10-30

## 2024-10-30 NOTE — PROGRESS NOTES
Vaccine Information Sheet, \"Influenza - Inactivated\"  given to Rolando Kern, or parent/legal guardian of  Rolando Kern and verbalized understanding.   Injection was given in Left deltoid by Carina Shaikh MA.     Patient responses:  Have you ever had a reaction to a flu vaccine? no  Are you able to eat eggs without adverse effects?  Yes  Do you have any current illness?  No  Have you ever had Guillian [place] Syndrome?  No    Flu vaccine given per order. Please see immunization tab.  Patient tolerated injection & left the office a few minutes later feeling well.

## 2024-10-30 NOTE — PROGRESS NOTES
Medicare Annual Wellness Visit    Rolando Kern is here for Medicare AWV    Assessment & Plan   Mixed hyperlipidemia  Stable, on statin   -     lovastatin (MEVACOR) 40 MG tablet; Take 1 tablet by mouth nightly, Disp-90 tablet, R-1Normal  Essential hypertension, benign/ BLE swelling   Stable with lasix  -     furosemide (LASIX) 20 MG tablet; Take 1 tablet by mouth daily, Disp-90 tablet, R-3Normal    Hypothyroid   Stable with synthroid   -     levothyroxine (SYNTHROID) 75 MCG tablet; Take 1 tablet by mouth daily, Disp-90 tablet, R-1Normal    Need for influenza vaccination  -     Influenza, FLUCELVAX Trivalent, (age 6 mo+) IM, Preservative Free, 0.5mL    Recommendations for Preventive Services Due: see orders and patient instructions/AVS.  Recommended screening schedule for the next 5-10 years is provided to the patient in written form: see Patient Instructions/AVS.     Return in about 6 months (around 4/30/2025) for routine f/u, hypothyroid .     Subjective   The following acute and/or chronic problems were also addressed today:  Presents for AWV, accompanied by his sister   Is established pt of Mike KIM   No current concerns concerns   Had some low back pain that is resolved with otc tylenol/ibuprofen  No known injury, denies bowel or bladder dysfunction     Patient's complete Health Risk Assessment and screening values have been reviewed and are found in Flowsheets. The following problems were reviewed today and where indicated follow up appointments were made and/or referrals ordered.    Positive Risk Factor Screenings with Interventions:       Cognitive:   Clock Drawing Test (CDT): Normal  Words recalled: 0 Words Recalled  Total Score: (!) 2  Total Score Interpretation: Abnormal Mini-Cog  Interventions:  At baseline, cerebral palsy               Abnormal BMI (obese):  Body mass index is 31.55 kg/m². (!) Abnormal  Interventions:  Working on low carb diet, had slightly elevated hba1c and PCP counseled on

## 2025-04-21 ENCOUNTER — PATIENT MESSAGE (OUTPATIENT)
Dept: PRIMARY CARE CLINIC | Age: 60
End: 2025-04-21

## 2025-04-23 NOTE — TELEPHONE ENCOUNTER
Writer resubmitted Attestation for patient with [facility].  Attestation with [facility] for Special Supplemental Benefits for the Chronically Ill did not have a choice of Cerebral Palsy for the form nor did it have a place to free type that the patient has the condition.    Patient's sister informed through [employer] Message.

## 2025-04-26 DIAGNOSIS — E03.9 HYPOTHYROIDISM, UNSPECIFIED TYPE: ICD-10-CM

## 2025-04-26 DIAGNOSIS — E78.2 MIXED HYPERLIPIDEMIA: ICD-10-CM

## 2025-04-28 SDOH — ECONOMIC STABILITY: FOOD INSECURITY: WITHIN THE PAST 12 MONTHS, THE FOOD YOU BOUGHT JUST DIDN'T LAST AND YOU DIDN'T HAVE MONEY TO GET MORE.: SOMETIMES TRUE

## 2025-04-28 SDOH — ECONOMIC STABILITY: INCOME INSECURITY: IN THE LAST 12 MONTHS, WAS THERE A TIME WHEN YOU WERE NOT ABLE TO PAY THE MORTGAGE OR RENT ON TIME?: NO

## 2025-04-28 SDOH — ECONOMIC STABILITY: FOOD INSECURITY: WITHIN THE PAST 12 MONTHS, YOU WORRIED THAT YOUR FOOD WOULD RUN OUT BEFORE YOU GOT MONEY TO BUY MORE.: NEVER TRUE

## 2025-04-28 ASSESSMENT — PATIENT HEALTH QUESTIONNAIRE - PHQ9
SUM OF ALL RESPONSES TO PHQ QUESTIONS 1-9: 0
SUM OF ALL RESPONSES TO PHQ QUESTIONS 1-9: 0
2. FEELING DOWN, DEPRESSED OR HOPELESS: NOT AT ALL
SUM OF ALL RESPONSES TO PHQ QUESTIONS 1-9: 0
SUM OF ALL RESPONSES TO PHQ9 QUESTIONS 1 & 2: 0
2. FEELING DOWN, DEPRESSED OR HOPELESS: NOT AT ALL
SUM OF ALL RESPONSES TO PHQ QUESTIONS 1-9: 0
1. LITTLE INTEREST OR PLEASURE IN DOING THINGS: NOT AT ALL
1. LITTLE INTEREST OR PLEASURE IN DOING THINGS: NOT AT ALL

## 2025-04-29 RX ORDER — LEVOTHYROXINE SODIUM 75 UG/1
75 TABLET ORAL DAILY
Qty: 90 TABLET | Refills: 0 | Status: SHIPPED | OUTPATIENT
Start: 2025-04-29 | End: 2025-05-01 | Stop reason: SDUPTHER

## 2025-04-29 RX ORDER — LOVASTATIN 40 MG/1
40 TABLET ORAL NIGHTLY
Qty: 90 TABLET | Refills: 0 | Status: SHIPPED | OUTPATIENT
Start: 2025-04-29 | End: 2025-05-01 | Stop reason: SDUPTHER

## 2025-05-01 ENCOUNTER — OFFICE VISIT (OUTPATIENT)
Dept: PRIMARY CARE CLINIC | Age: 60
End: 2025-05-01
Payer: MEDICAID

## 2025-05-01 VITALS
BODY MASS INDEX: 29.07 KG/M2 | HEART RATE: 88 BPM | OXYGEN SATURATION: 97 % | RESPIRATION RATE: 16 BRPM | DIASTOLIC BLOOD PRESSURE: 82 MMHG | SYSTOLIC BLOOD PRESSURE: 120 MMHG | WEIGHT: 154 LBS | HEIGHT: 61 IN

## 2025-05-01 DIAGNOSIS — R73.03 PREDIABETES: Primary | ICD-10-CM

## 2025-05-01 DIAGNOSIS — E03.9 HYPOTHYROIDISM, UNSPECIFIED TYPE: ICD-10-CM

## 2025-05-01 DIAGNOSIS — E78.2 MIXED HYPERLIPIDEMIA: ICD-10-CM

## 2025-05-01 DIAGNOSIS — I10 ESSENTIAL HYPERTENSION, BENIGN: ICD-10-CM

## 2025-05-01 PROCEDURE — 3079F DIAST BP 80-89 MM HG: CPT | Performed by: PHYSICIAN ASSISTANT

## 2025-05-01 PROCEDURE — 99214 OFFICE O/P EST MOD 30 MIN: CPT | Performed by: PHYSICIAN ASSISTANT

## 2025-05-01 PROCEDURE — 3074F SYST BP LT 130 MM HG: CPT | Performed by: PHYSICIAN ASSISTANT

## 2025-05-01 RX ORDER — LOVASTATIN 40 MG/1
40 TABLET ORAL NIGHTLY
Qty: 90 TABLET | Refills: 3 | Status: SHIPPED | OUTPATIENT
Start: 2025-05-01

## 2025-05-01 RX ORDER — LEVOTHYROXINE SODIUM 75 UG/1
75 TABLET ORAL DAILY
Qty: 90 TABLET | Refills: 3 | Status: SHIPPED | OUTPATIENT
Start: 2025-05-01

## 2025-05-01 RX ORDER — FUROSEMIDE 20 MG/1
20 TABLET ORAL DAILY
Qty: 90 TABLET | Refills: 3 | Status: SHIPPED | OUTPATIENT
Start: 2025-05-01

## 2025-05-01 ASSESSMENT — ENCOUNTER SYMPTOMS
SINUS PAIN: 0
VOMITING: 0
EYE PAIN: 0
COUGH: 0
SHORTNESS OF BREATH: 0
RHINORRHEA: 0
ABDOMINAL PAIN: 0
NAUSEA: 0
BACK PAIN: 0
CONSTIPATION: 0
DIARRHEA: 0

## 2025-05-01 NOTE — PROGRESS NOTES
Systems   Constitutional:  Negative for chills, fatigue and fever.   HENT:  Negative for congestion, rhinorrhea and sinus pain.    Eyes:  Negative for pain.   Respiratory:  Negative for cough and shortness of breath.    Cardiovascular:  Negative for chest pain and leg swelling.   Gastrointestinal:  Negative for abdominal pain, constipation, diarrhea, nausea and vomiting.   Genitourinary:  Negative for difficulty urinating, enuresis and testicular pain.   Musculoskeletal:  Positive for gait problem. Negative for arthralgias, back pain and myalgias.   Skin:  Negative for rash.   Neurological:  Negative for dizziness and headaches.   Psychiatric/Behavioral:  Negative for confusion and sleep disturbance. The patient is not nervous/anxious.    All other systems reviewed and are negative.      Objective:     Physical Exam  Vitals and nursing note reviewed.   Constitutional:       General: He is not in acute distress.     Appearance: Normal appearance. He is obese.   HENT:      Head: Normocephalic.      Mouth/Throat:      Mouth: Mucous membranes are moist.   Eyes:      Extraocular Movements: Extraocular movements intact.      Conjunctiva/sclera: Conjunctivae normal.      Pupils: Pupils are equal, round, and reactive to light.   Cardiovascular:      Rate and Rhythm: Normal rate and regular rhythm.      Pulses: Normal pulses.      Heart sounds: Normal heart sounds.   Pulmonary:      Effort: Pulmonary effort is normal. No respiratory distress.      Breath sounds: Normal breath sounds.   Abdominal:      General: Abdomen is flat. Bowel sounds are normal.      Palpations: Abdomen is soft.      Tenderness: There is no abdominal tenderness.   Musculoskeletal:      Cervical back: Normal range of motion.      Right lower leg: No edema.      Left lower leg: No edema.   Lymphadenopathy:      Cervical: No cervical adenopathy.   Skin:     General: Skin is warm.      Capillary Refill: Capillary refill takes less than 2 seconds.

## 2025-05-14 ENCOUNTER — RESULTS FOLLOW-UP (OUTPATIENT)
Dept: PRIMARY CARE CLINIC | Age: 60
End: 2025-05-14

## 2025-05-14 ENCOUNTER — HOSPITAL ENCOUNTER (OUTPATIENT)
Age: 60
Discharge: HOME | End: 2025-05-14
Payer: MEDICARE

## 2025-05-14 DIAGNOSIS — I10 ESSENTIAL HYPERTENSION, BENIGN: ICD-10-CM

## 2025-05-14 DIAGNOSIS — R73.03 PREDIABETES: ICD-10-CM

## 2025-05-14 DIAGNOSIS — E78.2 MIXED HYPERLIPIDEMIA: ICD-10-CM

## 2025-05-14 DIAGNOSIS — E03.9 HYPOTHYROIDISM, UNSPECIFIED TYPE: ICD-10-CM

## 2025-05-14 LAB
ALBUMIN SERPL-MCNC: 3.9 G/DL (ref 3.5–5.2)
ALP SERPL-CCNC: 76 U/L (ref 40–129)
ALT SERPL-CCNC: 22 U/L (ref 10–50)
ANION GAP SERPL CALCULATED.3IONS-SCNC: 11 MMOL/L (ref 9–16)
AST SERPL-CCNC: 23 U/L (ref 10–50)
BASOPHILS # BLD: 0 K/UL (ref 0–0.2)
BASOPHILS NFR BLD: 1 % (ref 0–2)
BILIRUB SERPL-MCNC: 0.4 MG/DL (ref 0–1.2)
BUN SERPL-MCNC: 19 MG/DL (ref 8–23)
CALCIUM SERPL-MCNC: 9 MG/DL (ref 8.6–10.4)
CHLORIDE SERPL-SCNC: 106 MMOL/L (ref 98–107)
CHOLEST SERPL-MCNC: 130 MG/DL (ref 0–199)
CHOLESTEROL/HDL RATIO: 4.1
CO2 SERPL-SCNC: 24 MMOL/L (ref 20–31)
CREAT SERPL-MCNC: 1 MG/DL (ref 0.7–1.2)
EOSINOPHIL # BLD: 0.2 K/UL (ref 0–0.4)
EOSINOPHILS RELATIVE PERCENT: 5 % (ref 0–4)
ERYTHROCYTE [DISTWIDTH] IN BLOOD BY AUTOMATED COUNT: 13.3 % (ref 11.5–14.9)
EST. AVERAGE GLUCOSE BLD GHB EST-MCNC: 120 MG/DL
GFR, ESTIMATED: 86 ML/MIN/1.73M2
GLUCOSE P FAST SERPL-MCNC: 117 MG/DL (ref 74–99)
HBA1C MFR BLD: 5.8 % (ref 4–6)
HCT VFR BLD AUTO: 37.3 % (ref 41–53)
HDLC SERPL-MCNC: 32 MG/DL
HGB BLD-MCNC: 12.9 G/DL (ref 13.5–17.5)
LDLC SERPL CALC-MCNC: 81 MG/DL (ref 0–100)
LYMPHOCYTES NFR BLD: 0.9 K/UL (ref 1–4.8)
LYMPHOCYTES RELATIVE PERCENT: 22 % (ref 24–44)
MCH RBC QN AUTO: 29.9 PG (ref 26–34)
MCHC RBC AUTO-ENTMCNC: 34.6 G/DL (ref 31–37)
MCV RBC AUTO: 86.6 FL (ref 80–100)
MONOCYTES NFR BLD: 0.5 K/UL (ref 0.1–1.3)
MONOCYTES NFR BLD: 12 % (ref 1–7)
NEUTROPHILS NFR BLD: 60 % (ref 36–66)
NEUTS SEG NFR BLD: 2.4 K/UL (ref 1.3–9.1)
PLATELET # BLD AUTO: 238 K/UL (ref 150–450)
PMV BLD AUTO: 6.9 FL (ref 6–12)
POTASSIUM SERPL-SCNC: 3.9 MMOL/L (ref 3.7–5.3)
PROT SERPL-MCNC: 7.3 G/DL (ref 6.6–8.7)
RBC # BLD AUTO: 4.3 M/UL (ref 4.5–5.9)
SODIUM SERPL-SCNC: 141 MMOL/L (ref 136–145)
T4 FREE SERPL-MCNC: 1.2 NG/DL (ref 0.9–1.7)
TRIGL SERPL-MCNC: 84 MG/DL (ref 0–149)
TSH SERPL DL<=0.05 MIU/L-ACNC: 3.66 UIU/ML (ref 0.27–4.2)
WBC OTHER # BLD: 4.1 K/UL (ref 3.5–11)

## 2025-05-14 PROCEDURE — 84439 ASSAY OF FREE THYROXINE: CPT

## 2025-05-14 PROCEDURE — 80061 LIPID PANEL: CPT

## 2025-05-14 PROCEDURE — 84443 ASSAY THYROID STIM HORMONE: CPT

## 2025-05-14 PROCEDURE — 83036 HEMOGLOBIN GLYCOSYLATED A1C: CPT

## 2025-05-14 PROCEDURE — 36415 COLL VENOUS BLD VENIPUNCTURE: CPT

## 2025-05-14 PROCEDURE — 85025 COMPLETE CBC W/AUTO DIFF WBC: CPT

## 2025-05-14 PROCEDURE — 80053 COMPREHEN METABOLIC PANEL: CPT
